# Patient Record
Sex: FEMALE | Race: WHITE | NOT HISPANIC OR LATINO | Employment: OTHER | URBAN - METROPOLITAN AREA
[De-identification: names, ages, dates, MRNs, and addresses within clinical notes are randomized per-mention and may not be internally consistent; named-entity substitution may affect disease eponyms.]

---

## 2020-10-14 ENCOUNTER — APPOINTMENT (OUTPATIENT)
Dept: GENERAL RADIOLOGY | Facility: HOSPITAL | Age: 85
End: 2020-10-14

## 2020-10-14 ENCOUNTER — HOSPITAL ENCOUNTER (EMERGENCY)
Facility: HOSPITAL | Age: 85
Discharge: HOME | End: 2020-10-15
Attending: EMERGENCY MEDICINE | Admitting: EMERGENCY MEDICINE

## 2020-10-14 DIAGNOSIS — S09.90XA CLOSED HEAD INJURY, INITIAL ENCOUNTER: ICD-10-CM

## 2020-10-14 DIAGNOSIS — S42.101A CLOSED FRACTURE OF RIGHT SCAPULA, UNSPECIFIED PART OF SCAPULA, INITIAL ENCOUNTER: ICD-10-CM
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
## 2020-10-14 LAB
BASOPHILS # BLD AUTO: 0.04 10*3/MM3 (ref 0–0.2)
BASOPHILS NFR BLD AUTO: 0.5 % (ref 0–1.5)
CK SERPL-CCNC: 159 U/L (ref 20–180)
DEPRECATED RDW RBC AUTO: 40.6 FL (ref 37–54)
EOSINOPHIL # BLD AUTO: 0.28 10*3/MM3 (ref 0–0.4)
EOSINOPHIL NFR BLD AUTO: 3.6 % (ref 0.3–6.2)
ERYTHROCYTE [DISTWIDTH] IN BLOOD BY AUTOMATED COUNT: 13.3 % (ref 12.3–15.4)
HCT VFR BLD AUTO: 39.4 % (ref 34–46.6)
HGB BLD-MCNC: 13.3 G/DL (ref 12–15.9)
IMM GRANULOCYTES # BLD AUTO: 0.04 10*3/MM3 (ref 0–0.05)
IMM GRANULOCYTES NFR BLD AUTO: 0.5 % (ref 0–0.5)
LYMPHOCYTES # BLD AUTO: 1.88 10*3/MM3 (ref 0.7–3.1)
LYMPHOCYTES NFR BLD AUTO: 23.9 % (ref 19.6–45.3)
MCH RBC QN AUTO: 28.7 PG (ref 26.6–33)
MCHC RBC AUTO-ENTMCNC: 33.8 G/DL (ref 31.5–35.7)
MCV RBC AUTO: 85.1 FL (ref 79–97)
MONOCYTES # BLD AUTO: 0.83 10*3/MM3 (ref 0.1–0.9)
MONOCYTES NFR BLD AUTO: 10.5 % (ref 5–12)
NEUTROPHILS NFR BLD AUTO: 4.81 10*3/MM3 (ref 1.7–7)
NEUTROPHILS NFR BLD AUTO: 61 % (ref 42.7–76)
NRBC BLD AUTO-RTO: 0 /100 WBC (ref 0–0.2)
NT-PROBNP SERPL-MCNC: 624.1 PG/ML (ref 0–1800)
PLATELET # BLD AUTO: 174 10*3/MM3 (ref 140–450)
PMV BLD AUTO: 10.3 FL (ref 6–12)
RBC # BLD AUTO: 4.63 10*6/MM3 (ref 3.77–5.28)
WBC # BLD AUTO: 7.88 10*3/MM3 (ref 3.4–10.8)

## 2020-10-14 PROCEDURE — 82550 ASSAY OF CK (CPK): CPT | Performed by: PHYSICIAN ASSISTANT

## 2020-10-14 PROCEDURE — 85025 COMPLETE CBC W/AUTO DIFF WBC: CPT | Performed by: PHYSICIAN ASSISTANT

## 2020-10-14 PROCEDURE — 80053 COMPREHEN METABOLIC PANEL: CPT | Performed by: EMERGENCY MEDICINE

## 2020-10-14 PROCEDURE — 83880 ASSAY OF NATRIURETIC PEPTIDE: CPT | Performed by: PHYSICIAN ASSISTANT

## 2020-10-14 PROCEDURE — 36415 COLL VENOUS BLD VENIPUNCTURE: CPT

## 2020-10-14 PROCEDURE — 99283 EMERGENCY DEPT VISIT LOW MDM: CPT

## 2020-10-14 PROCEDURE — 73030 X-RAY EXAM OF SHOULDER: CPT

## 2020-10-15 ENCOUNTER — APPOINTMENT (OUTPATIENT)
Dept: CT IMAGING | Facility: HOSPITAL | Age: 85
End: 2020-10-15

## 2020-10-15 VITALS
RESPIRATION RATE: 18 BRPM | HEART RATE: 63 BPM | SYSTOLIC BLOOD PRESSURE: 183 MMHG | OXYGEN SATURATION: 95 % | HEIGHT: 62 IN | BODY MASS INDEX: 25.03 KG/M2 | DIASTOLIC BLOOD PRESSURE: 92 MMHG | WEIGHT: 136 LBS | TEMPERATURE: 97.6 F

## 2020-10-15 LAB
ALBUMIN SERPL-MCNC: 4.3 G/DL (ref 3.5–5.2)
ALBUMIN/GLOB SERPL: 1.5 G/DL
ALP SERPL-CCNC: 93 U/L (ref 39–117)
ALT SERPL W P-5'-P-CCNC: 16 U/L (ref 1–33)
ANION GAP SERPL CALCULATED.3IONS-SCNC: 11.4 MMOL/L (ref 5–15)
AST SERPL-CCNC: 21 U/L (ref 1–32)
BILIRUB SERPL-MCNC: 0.5 MG/DL (ref 0–1.2)
BUN SERPL-MCNC: 19 MG/DL (ref 8–23)
BUN/CREAT SERPL: 24.1 (ref 7–25)
CALCIUM SPEC-SCNC: 9.4 MG/DL (ref 8.6–10.5)
CHLORIDE SERPL-SCNC: 105 MMOL/L (ref 98–107)
CO2 SERPL-SCNC: 26.6 MMOL/L (ref 22–29)
CREAT SERPL-MCNC: 0.79 MG/DL (ref 0.57–1)
GFR SERPL CREATININE-BSD FRML MDRD: 69 ML/MIN/1.73
GLOBULIN UR ELPH-MCNC: 2.8 GM/DL
GLUCOSE SERPL-MCNC: 100 MG/DL (ref 65–99)
HOLD SPECIMEN: NORMAL
HOLD SPECIMEN: NORMAL
POTASSIUM SERPL-SCNC: 4 MMOL/L (ref 3.5–5.2)
PROT SERPL-MCNC: 7.1 G/DL (ref 6–8.5)
SODIUM SERPL-SCNC: 143 MMOL/L (ref 136–145)
WHOLE BLOOD HOLD SPECIMEN: NORMAL
WHOLE BLOOD HOLD SPECIMEN: NORMAL

## 2020-10-15 PROCEDURE — 72125 CT NECK SPINE W/O DYE: CPT

## 2020-10-15 PROCEDURE — 70450 CT HEAD/BRAIN W/O DYE: CPT

## 2020-10-15 NOTE — ED PROVIDER NOTES
EMERGENCY DEPARTMENT ENCOUNTER    CHIEF COMPLAINT  Chief Complaint: Fall  History given by: Patient/daughter  History limited by: None  Room Number: 11/11  PMD: Provider, No Known      HPI:  Pt is a 88 y.o. female who presents complaining of a mechanical fall that occurred 3 days ago.  The patient is unsure of exactly why she fell but states that she falls frequently.  The daughter states that she has a known acoustic neuroma which they said certainly could contribute to her falls.  The patient states that she fell suddenly Saturday night but could not get up at that point and spent the entire night in the floor until she was helped up [place] morning.  Since then the patient states that she has been increasingly more sore to the head as well as to the posterior right shoulder.  Pain is described as a dull ache and more of a sore type sensation.  Symptoms are nonradiating.  Symptoms are currently mild to moderate in intensity and progressively worsening.  The patient has been ambulatory without any difficulty since this fall.  There is been no treatment rendered prior to arrival.  The patient denies any associated neck pain, chest pain, abdominal pain, nausea/vomiting, dizziness, vertigo, blurry vision, or known sick contacts.  There are no alleviating nor aggravating factors.      PAST MEDICAL HISTORY  Active Ambulatory Problems     Diagnosis Date Noted   • No Active Ambulatory Problems     Resolved Ambulatory Problems     Diagnosis Date Noted   • No Resolved Ambulatory Problems     No Additional Past Medical History       PAST SURGICAL HISTORY  History reviewed. No pertinent surgical history.    FAMILY HISTORY  No family history on file.    SOCIAL HISTORY  Social History     Socioeconomic History   • Marital status:      Spouse name: Not on file   • Number of children: Not on file   • Years of education: Not on file   • Highest education level: Not on file       ALLERGIES  Bacitracin    REVIEW OF  SYSTEMS  Review of Systems   Constitutional: Negative for fever.   HENT: Negative for sore throat.    Eyes: Negative.    Respiratory: Negative for cough and shortness of breath.    Cardiovascular: Negative for chest pain.   Gastrointestinal: Negative for abdominal pain, diarrhea and vomiting.   Genitourinary: Negative for dysuria.   Musculoskeletal: Negative for neck pain.        Right shoulder pain   Skin: Negative for rash.   Allergic/Immunologic: Negative.    Neurological: Positive for headaches. Negative for weakness and numbness.   Hematological: Negative.    Psychiatric/Behavioral: Negative.    All other systems reviewed and are negative.      PHYSICAL EXAM  ED Triage Vitals [10/14/20 2206]   Temp Heart Rate Resp BP SpO2   97.6 °F (36.4 °C) 56 18 -- 96 %      Temp src Heart Rate Source Patient Position BP Location FiO2 (%)   Tympanic -- -- -- --       Physical Exam   Constitutional: She is oriented to person, place, and time. No distress.   HENT:   Head: Normocephalic and atraumatic.   Healing scalp contusion   Eyes: Pupils are equal, round, and reactive to light. EOM are normal.   Neck: Normal range of motion. Neck supple.   Cardiovascular: Normal rate, regular rhythm and normal heart sounds.   Pulmonary/Chest: Effort normal and breath sounds normal. No respiratory distress.   Abdominal: Soft. There is no abdominal tenderness. There is no rebound and no guarding.   Musculoskeletal: Normal range of motion.         General: No edema.   Neurological: She is alert and oriented to person, place, and time. She has normal sensation and normal strength.   Skin: Skin is warm and dry. No rash noted.   Healing contusion to the posterior right shoulder/scapula   Psychiatric: Mood and affect normal.   Nursing note and vitals reviewed.      LAB RESULTS  Lab Results (last 24 hours)     Procedure Component Value Units Date/Time    CBC & Differential [[id number]]  (Normal) Collected: 10/14/20 2848    Specimen: Blood from Arm,  Right Updated: 10/14/20 2318    Narrative:      The following orders were created for panel order CBC & Differential.  Procedure                               Abnormality         Status                     ---------                               -----------         ------                     CBC Auto Differential[[id number]]        Normal              Final result                 Please view results for these tests on the individual orders.    BNP [[id number]]  (Normal) Collected: 10/14/20 2259    Specimen: Blood from Arm, Right Updated: 10/14/20 2326     proBNP 624.1 pg/mL     Narrative:      Among patients with dyspnea, NT-proBNP is highly sensitive for the detection of acute congestive heart failure. In addition NT-proBNP of <300 pg/ml effectively rules out acute congestive heart failure with 99% negative predictive value.    Results may be falsely decreased if patient taking Biotin.      CK [[id number]]  (Normal) Collected: 10/14/20 2259    Specimen: Blood from Arm, Right Updated: 10/14/20 2327     Creatine Kinase 159 U/L     CBC Auto Differential [[id number]]  (Normal) Collected: 10/14/20 2259    Specimen: Blood from Arm, Right Updated: 10/14/20 2318     WBC 7.88 10*3/mm3      RBC 4.63 10*6/mm3      Hemoglobin 13.3 g/dL      Hematocrit 39.4 %      MCV 85.1 fL      MCH 28.7 pg      MCHC 33.8 g/dL      RDW 13.3 %      RDW-SD 40.6 fl      MPV 10.3 fL      Platelets 174 10*3/mm3      Neutrophil % 61.0 %      Lymphocyte % 23.9 %      Monocyte % 10.5 %      Eosinophil % 3.6 %      Basophil % 0.5 %      Immature Grans % 0.5 %      Neutrophils, Absolute 4.81 10*3/mm3      Lymphocytes, Absolute 1.88 10*3/mm3      Monocytes, Absolute 0.83 10*3/mm3      Eosinophils, Absolute 0.28 10*3/mm3      Basophils, Absolute 0.04 10*3/mm3      Immature Grans, Absolute 0.04 10*3/mm3      nRBC 0.0 /100 WBC     Comprehensive Metabolic Panel [[id number]]  (Abnormal) Collected: 10/14/20 2300    Specimen: Blood from Arm, Right Updated: 10/15/20  0008     Glucose 100 mg/dL      BUN 19 mg/dL      Creatinine 0.79 mg/dL      Sodium 143 mmol/L      Potassium 4.0 mmol/L      Chloride 105 mmol/L      CO2 26.6 mmol/L      Calcium 9.4 mg/dL      Total Protein 7.1 g/dL      Albumin 4.30 g/dL      ALT (SGPT) 16 U/L      AST (SGOT) 21 U/L      Alkaline Phosphatase 93 U/L      Total Bilirubin 0.5 mg/dL      eGFR Non African Amer 69 mL/min/1.73      Globulin 2.8 gm/dL      A/G Ratio 1.5 g/dL      BUN/Creatinine Ratio 24.1     Anion Gap 11.4 mmol/L     Narrative:      GFR Normal >60  Chronic Kidney Disease <60  Kidney Failure <15            I ordered the above labs and reviewed the results    RADIOLOGY  CT Head Without Contrast   Final Result   Impression:   1.  Effacement of the CSF space over the superior aspects of the frontal   and parietal lobes. Findings can be seen in setting of normal pressure   hydrocephalus in the appropriate clinical context correlation patient   history is recommended. Otherwise, no findings of acute intracranial   abnormality. Moderate to extensive chronic ischemic changes, cerebral   atrophy and lacunar infarct.   2.  No cervical spine fracture or malalignment.   3.  Other findings as above.       This report was finalized on 10/15/2020 1:28 AM by Dr. Michelet Ledesma M.D.          CT Cervical Spine Without Contrast   Final Result   Impression:   1.  Effacement of the CSF space over the superior aspects of the frontal   and parietal lobes. Findings can be seen in setting of normal pressure   hydrocephalus in the appropriate clinical context correlation patient   history is recommended. Otherwise, no findings of acute intracranial   abnormality. Moderate to extensive chronic ischemic changes, cerebral   atrophy and lacunar infarct.   2.  No cervical spine fracture or malalignment.   3.  Other findings as above.       This report was finalized on 10/15/2020 1:28 AM by Dr. Michelet Ledesma M.D.          XR Shoulder 2+ View Right   Final Result    FINDINGS AND IMPRESSION:   There is a comminuted fracture extending through the acromion and   scapula with concern for extension into the adjacent glenoid.   Unfortunately findings are not well characterized on plain film shoulder   radiographs and further evaluation with CT of the right shoulder is   recommended to further characterize. The glenohumeral joint is located.       This report was finalized on 10/15/2020 12:31 AM by Dr. Michelet Ledesma M.D.               I ordered the above noted radiological studies. Interpreted by radiologist.  Reviewed by me in PACS.       PROCEDURES  Procedures      PROGRESS AND CONSULTS     The patient was wearing a facemask upon entrance into the room and remained in such throughout their visit.  I was wearing PPE including a facemask, eye protection, as well as gloves at any point entering the room and throughout the visit    0200  Upon reevaluation, the patient is resting comfortably in the room and without any further complaint.  I did inform the family of her unremarkable laboratory results as well as CT scan of the head and cervical spine.  I did inform them of the right-sided scapular fracture present on imaging.  I did suggest that we place the patient in a sling and have her follow-up with orthopedics for further management and input of the fracture.  The patient as well as daughter agrees and all questions have been answered.    MEDICAL DECISION MAKING  Results were reviewed/discussed with the patient and they were also made aware of online access. Pt also made aware that some labs, such as cultures, will not be resulted during ER visit and follow up with PMD is necessary.     MDM  Number of Diagnoses or Management Options  Closed fracture of right scapula, unspecified part of scapula, initial encounter:   Closed head injury, initial encounter:   Fall, initial encounter:      Amount and/or Complexity of Data Reviewed  Clinical lab tests: ordered and reviewed  Tests in  the radiology section of CPT®: ordered and reviewed  Review and summarize past medical records: yes (There are no previous emergency room records available for review)  Independent visualization of images, tracings, or specimens: yes (X-ray of the right shoulder showing a scapular fracture.  CT scan of the head/cervical spine negative for acute traumatic abnormality.)           DIAGNOSIS  Final diagnoses:   Fall, initial encounter   Closed head injury, initial encounter   Closed fracture of right scapula, unspecified part of scapula, initial encounter       DISPOSITION  DISCHARGE    Patient discharged in stable condition.    Reviewed implications of results, diagnosis, meds, responsibility to follow up, warning signs and symptoms of possible worsening, potential complications and reasons to return to ER    Patient/Family voiced understanding of above instructions.    Discussed plan for discharge, as there is no emergent indication for admission. Patient referred to primary care provider for BP management due to today's BP. Pt/family is agreeable and understands need for follow up and repeat testing.  Pt is aware that discharge does not mean that nothing is wrong but it indicates no emergency is present that requires admission and they must continue care with follow-up as given below or physician of their choice.     FOLLOW-UP  Inocente Boone MD  [address] [id number] [phone]    Schedule an appointment as soon as possible for a visit            Medication List      No changes were made to your prescriptions during this visit.         Latest Documented Vital Signs:  As of 06:41 EDT  BP- (!) 183/92 HR- 63 Temp- 97.6 °F (36.4 °C) (Tympanic) O2 sat- 95%       Sawyer Hawk MD  10/15/20 0600

## 2020-10-15 NOTE — ED TRIAGE NOTES
"Pt arrives via pv with daughter from [facility]. Pt fell on Sunday and said \"I was laying there all night because I did not want to wake my kids up.\" pt lives at home by herself. Denies LOC. Pt hit head on floor, bruising noted to back of pt head. Pt also bruising to right shoulder and right elbow. No thinners. Pt is alert and oriented x4, answering questions appropriately and appears to be in NAD at this time.     Pt given mask in triage, staff in PPE.   "

## 2020-10-15 NOTE — ED NOTES
Patient provided discharge instructions at this time.  Respirations are even and unlabored, chest rise and fall is equal in expansion.  All patients questions answered prior to departure from the ED.  Pt capillary refill within normal limit, speech normal.      Pt masked upon arrival. This nurse wearing mask and goggles during entire encounter.     Mariia Quezada RN  10/15/20 7267

## 2021-01-10 ENCOUNTER — APPOINTMENT (OUTPATIENT)
Dept: CT IMAGING | Facility: HOSPITAL | Age: 86
End: 2021-01-10

## 2021-01-10 ENCOUNTER — HOSPITAL ENCOUNTER (EMERGENCY)
Facility: HOSPITAL | Age: 86
Discharge: HOME | End: 2021-01-10
Attending: EMERGENCY MEDICINE | Admitting: EMERGENCY MEDICINE

## 2021-01-10 VITALS
RESPIRATION RATE: 18 BRPM | WEIGHT: 136 LBS | DIASTOLIC BLOOD PRESSURE: 78 MMHG | HEART RATE: 49 BPM | SYSTOLIC BLOOD PRESSURE: 131 MMHG | BODY MASS INDEX: 25.03 KG/M2 | HEIGHT: 62 IN | OXYGEN SATURATION: 94 % | TEMPERATURE: 96.3 F

## 2021-01-10 DIAGNOSIS — S32.010A COMPRESSION FRACTURE OF L1 VERTEBRA, INITIAL ENCOUNTER (HCC): ICD-10-CM

## 2021-01-10 DIAGNOSIS — S22.31XA CLOSED FRACTURE OF ONE RIB OF RIGHT SIDE, INITIAL ENCOUNTER: Primary | ICD-10-CM

## 2021-01-10 DIAGNOSIS — S22.080A COMPRESSION FRACTURE OF T12 VERTEBRA, INITIAL ENCOUNTER (HCC): ICD-10-CM

## 2021-01-10 LAB
BACTERIA UR QL AUTO: ABNORMAL /HPF
BILIRUB UR QL STRIP: NEGATIVE
CLARITY UR: CLEAR
COLOR UR: YELLOW
GLUCOSE UR STRIP-MCNC: NEGATIVE MG/DL
HGB UR QL STRIP.AUTO: ABNORMAL
HYALINE CASTS UR QL AUTO: ABNORMAL /LPF
KETONES UR QL STRIP: NEGATIVE
LEUKOCYTE ESTERASE UR QL STRIP.AUTO: ABNORMAL
NITRITE UR QL STRIP: NEGATIVE
PH UR STRIP.AUTO: 7 [PH] (ref 5–8)
PROT UR QL STRIP: NEGATIVE
RBC # UR: ABNORMAL /HPF
REF LAB TEST METHOD: ABNORMAL
SP GR UR STRIP: 1.01 (ref 1–1.03)
SQUAMOUS #/AREA URNS HPF: ABNORMAL /HPF
UROBILINOGEN UR QL STRIP: ABNORMAL
WBC UR QL AUTO: ABNORMAL /HPF

## 2021-01-10 PROCEDURE — 99283 EMERGENCY DEPT VISIT LOW MDM: CPT

## 2021-01-10 PROCEDURE — P9612 CATHETERIZE FOR URINE SPEC: HCPCS

## 2021-01-10 PROCEDURE — 71250 CT THORAX DX C-: CPT

## 2021-01-10 PROCEDURE — 81001 URINALYSIS AUTO W/SCOPE: CPT | Performed by: EMERGENCY MEDICINE

## 2021-01-10 PROCEDURE — 70450 CT HEAD/BRAIN W/O DYE: CPT

## 2021-01-10 RX ORDER — ATORVASTATIN CALCIUM 40 MG/1
40 TABLET, FILM COATED ORAL DAILY
COMMUNITY
Start: 2020-11-29

## 2021-01-10 RX ORDER — SERTRALINE HYDROCHLORIDE 100 MG/1
TABLET, FILM COATED ORAL
COMMUNITY
Start: 2020-08-20

## 2021-01-10 RX ORDER — PROPRANOLOL HYDROCHLORIDE 80 MG/1
CAPSULE, EXTENDED RELEASE ORAL
COMMUNITY
Start: 2020-08-25 | End: 2022-01-01 | Stop reason: HOSPADM

## 2021-01-10 RX ORDER — FELODIPINE 10 MG/1
TABLET, EXTENDED RELEASE ORAL
COMMUNITY
Start: 2020-10-12

## 2021-01-10 RX ORDER — LEVOTHYROXINE SODIUM 0.1 MG/1
TABLET ORAL
COMMUNITY
Start: 2020-10-02 | End: 2022-01-01 | Stop reason: HOSPADM

## 2021-01-10 NOTE — ED PROVIDER NOTES
EMERGENCY DEPARTMENT ENCOUNTER    Room Number:  16/16  Date seen:  1/10/2021  PCP: Provider, No Known  Historian: Patient      HPI:  Chief Complaint: Rib pain  A complete HPI/ROS/PMH/PSH/SH/FH are unobtainable due to: Nothing  Context: Katie Douglas is a 89 y.o. female who presents to the ED c/o right sided rib pain after a fall from standing 2 days ago.  Patient reports that she lost her balance and went down hitting her chest and her head.  She reports no loss of consciousness.  She has had a mild intermittent headache since then.  She denies taking blood thinner medications.  She reports that she has pain mostly on the right side of her ribs and her right side of her back.  She has had a prior scapular fracture in the past.  She has been taking Tylenol for the pain with some relief.  She has not had any pain medication today.  She denies any significant pain in her extremities.  She denies neck pain.    Patient also reports some urinary frequency for the last few days.        PAST MEDICAL HISTORY  Active Ambulatory Problems     Diagnosis Date Noted   • No Active Ambulatory Problems     Resolved Ambulatory Problems     Diagnosis Date Noted   • No Resolved Ambulatory Problems     Past Medical History:   Diagnosis Date   • Arthritis    • Disease of thyroid gland    • Hyperlipidemia    • Hypertension          PAST SURGICAL HISTORY  History reviewed. No pertinent surgical history.      FAMILY HISTORY  History reviewed. No pertinent family history.      SOCIAL HISTORY  Social History     Socioeconomic History   • Marital status:      Spouse name: Not on file   • Number of children: Not on file   • Years of education: Not on file   • Highest education level: Not on file         ALLERGIES  Bacitracin        REVIEW OF SYSTEMS  Review of Systems   Review of all 14 systems is negative other than stated in the HPI above.      PHYSICAL EXAM  ED Triage Vitals   Temp Heart Rate Resp BP SpO2   01/10/21 1210 01/10/21  1210 01/10/21 1210 01/10/21 1218 01/10/21 1210   96.3 °F (35.7 °C) 63 18 175/83 97 %      Temp src Heart Rate Source Patient Position BP Location FiO2 (%)   01/10/21 1210 01/10/21 1210 01/10/21 1230 -- --   Tympanic Monitor Lying           GENERAL: Awake and alert, no acute distress  HENT: nares patent, no scalp hematoma  EYES: no scleral icterus  CV: regular rhythm, normal rate  RESPIRATORY: normal effort, lungs clear to auscultation bilaterally, right lateral chest wall tenderness without crepitance  ABDOMEN: soft, nondistended, nontender throughout  MUSCULOSKELETAL: no deformity.  There is no midline T or L-spine tenderness.  There is some mild point tenderness over the right scapular region.  No midline cervical spine tenderness.  NEURO: alert, moves all extremities, follows commands  PSYCH:  calm, cooperative  SKIN: warm, dry    Vital signs and nursing notes reviewed.          LAB RESULTS  Recent Results (from the past 24 hour(s))   Urinalysis With Microscopic If Indicated (No Culture) - Urine, Catheter    Collection Time: 01/10/21  1:29 PM    Specimen: Urine, Catheter   Result Value Ref Range    Color, UA Yellow Yellow, Straw    Appearance, UA Clear Clear    pH, UA 7.0 5.0 - 8.0    Specific Gravity, UA 1.012 1.005 - 1.030    Glucose, UA Negative Negative    Ketones, UA Negative Negative    Bilirubin, UA Negative Negative    Blood, UA Trace (A) Negative    Protein, UA Negative Negative    Leuk Esterase, UA Trace (A) Negative    Nitrite, UA Negative Negative    Urobilinogen, UA 0.2 E.U./dL 0.2 - 1.0 E.U./dL   Urinalysis, Microscopic Only - Urine, Catheter    Collection Time: 01/10/21  1:29 PM    Specimen: Urine, Catheter   Result Value Ref Range    RBC, UA 0-2 None Seen, 0-2 /HPF    WBC, UA 3-5 (A) None Seen, 0-2 /HPF    Bacteria, UA None Seen None Seen /HPF    Squamous Epithelial Cells, UA 0-2 None Seen, 0-2 /HPF    Hyaline Casts, UA 0-2 None Seen /LPF    Methodology Automated Microscopy        Ordered the  above labs and reviewed the results.        RADIOLOGY  Ct Head Without Contrast    Result Date: 1/10/2021  CT SCAN OF THE BRAIN WITHOUT CONTRAST  HISTORY: Fell 2 days ago with head trauma. Headache.  FINDINGS:  The CT scan was performed as an emergency procedure through the brain without contrast. There is moderate diffuse atrophy and chronic small vessel ischemic change similar to the study of 10/15/2020. There is no evidence of acute intracranial hemorrhage or mass effect and the visualized sinuses are clear.  CT SCAN OF THE CHEST WITHOUT CONTRAST  HISTORY: Fell 2 days ago. Right rib pain.  FINDINGS:  The CT scan was performed as an emergency procedure through the chest without contrast and demonstrates the followin. The lungs are well-expanded and clear except for some very minimal dependent atelectasis at the lung bases. There is also a faintly nodular density at the left lung base posteriorly measuring 5 to 6 mm as seen on image 33. A follow-up CT scan in 6 months might be considered for this minimal finding. 2. There are several calcified right hilar and infracarinal lymph nodes. There is no mediastinal or hilar or axillary adenopathy. There is no pericardial effusion. 3. The CT images through the upper liver, spleen, both adrenal glands, and upper poles of both kidneys are unremarkable. 4. At bone windows, there is a nondisplaced fracture of the right seventh rib laterally. There is also 50% compression of the T12 vertebral body of uncertain age. There is minimal retropulsion of the upper posterior margin. There is minimal compression of the superior endplate of L1.  Radiation dose reduction techniques were utilized, including automated exposure control and exposure modulation based on body size.      Ct Chest Without Contrast Diagnostic    Result Date: 1/10/2021  CT SCAN OF THE BRAIN WITHOUT CONTRAST  HISTORY: Fell 2 days ago with head trauma. Headache.  FINDINGS:  The CT scan was performed as an  emergency procedure through the brain without contrast. There is moderate diffuse atrophy and chronic small vessel ischemic change similar to the study of 10/15/2020. There is no evidence of acute intracranial hemorrhage or mass effect and the visualized sinuses are clear.  CT SCAN OF THE CHEST WITHOUT CONTRAST  HISTORY: Fell 2 days ago. Right rib pain.  FINDINGS:  The CT scan was performed as an emergency procedure through the chest without contrast and demonstrates the followin. The lungs are well-expanded and clear except for some very minimal dependent atelectasis at the lung bases. There is also a faintly nodular density at the left lung base posteriorly measuring 5 to 6 mm as seen on image 33. A follow-up CT scan in 6 months might be considered for this minimal finding. 2. There are several calcified right hilar and infracarinal lymph nodes. There is no mediastinal or hilar or axillary adenopathy. There is no pericardial effusion. 3. The CT images through the upper liver, spleen, both adrenal glands, and upper poles of both kidneys are unremarkable. 4. At bone windows, there is a nondisplaced fracture of the right seventh rib laterally. There is also 50% compression of the T12 vertebral body of uncertain age. There is minimal retropulsion of the upper posterior margin. There is minimal compression of the superior endplate of L1.  Radiation dose reduction techniques were utilized, including automated exposure control and exposure modulation based on body size.        Ordered the above noted radiological studies. Reviewed by me in PACS.            PROCEDURES  Procedures          MEDICATIONS GIVEN IN ER  Medications - No data to display                MEDICAL DECISION MAKING, PROGRESS, and CONSULTS    All labs have been independently reviewed by me.  All radiology studies have been reviewed by me and discussed with radiologist dictating the report.   EKG's independently viewed and interpreted by me.   Discussion below represents my analysis of pertinent findings related to patient's condition, differential diagnosis, treatment plan and final disposition.    ED Course as of Bo 10 1555   Sun Bo 10, 2021   1500 Patient updated on findings of seventh rib fracture and also T12 and L1 compression deformities.  She denies any back pain and had no midline L-spine tenderness.  I explained that she should follow-up with a spine doctor as outpatient regarding these fractures.  Given she has no pain at all think that bracing would be beneficial for her.  She is neuro intact.  Urinalysis was without evidence of urinary tract infection.  She has been voiding spontaneously here.  She denies urinary incontinence.  Patient was offered additional pain medication however she prefers to take Tylenol and use lidocaine patches which she has at home.  Return precautions were discussed.    [JR]      ED Course User Index  [JR] Eldon Sutherland MD              I wore a surgical mask, gloves, and eye protection during this patient encounter.  Patient also wearing a surgical mask.  Hand hygeine performed before and after seeing the patient.    DIAGNOSIS  Final diagnoses:   Closed fracture of one rib of right side, initial encounter   Compression fracture of L1 vertebra, initial encounter (CMS/[facility])   Compression fracture of T12 vertebra, initial encounter (CMS/[facility])         DISPOSITION  DISCHARGE    Patient discharged in stable condition.    Reviewed implications of results, diagnosis, meds, responsibility to follow up, warning signs and symptoms of possible worsening, potential complications and reasons to return to ER.    Patient/Family voiced understanding of above instructions.    Discussed plan for discharge, as there is no emergent indication for admission. Patient referred to primary care provider for BP management due to today's BP. Pt/family is agreeable and understands need for follow up and repeat testing.  Pt is aware that  discharge does not mean that nothing is wrong but it indicates no emergency is present that requires admission and they must continue care with follow-up as given below or physician of their choice.     FOLLOW-UP  Cory Allen MD  [address] [place] [id number] [phone]    Schedule an appointment as soon as possible for a visit            Medication List      No changes were made to your prescriptions during this visit.                   Latest Documented Vital Signs:  As of 15:55 EST  BP- 131/78 HR- (!) 49 Temp- 96.3 °F (35.7 °C) (Tympanic) O2 sat- 94%        --    Please note that portions of this were completed with a voice recognition program.          Eldon Sutherland MD  01/10/21 3503

## 2021-01-10 NOTE — ED TRIAGE NOTES
"Pt to ER via PV. Pt states fall this past Wednesday by losing her balance. Pt states \"I hurt all over\" but more specifically c/o right sided rib pain.     Pt also c/o urinary frequency     Patient in mask. This RN in appropriate PPE - including mask, goggles, and gloves during all of patient care.     "

## 2022-01-01 ENCOUNTER — APPOINTMENT (OUTPATIENT)
Dept: CT IMAGING | Facility: HOSPITAL | Age: 87
End: 2022-01-01

## 2022-01-01 ENCOUNTER — HOSPITAL ENCOUNTER (INPATIENT)
Facility: HOSPITAL | Age: 87
LOS: 10 days | Discharge: NURSING, REHAB OR LONG-TERM CARE | End: 2022-04-06
Attending: EMERGENCY MEDICINE | Admitting: HOSPITALIST

## 2022-01-01 ENCOUNTER — HOSPITAL ENCOUNTER (INPATIENT)
Facility: HOSPITAL | Age: 87
LOS: 7 days | End: 2022-05-28
Attending: INTERNAL MEDICINE | Admitting: INTERNAL MEDICINE

## 2022-01-01 ENCOUNTER — PREP FOR SURGERY (OUTPATIENT)
Dept: OTHER | Facility: HOSPITAL | Age: 87
End: 2022-01-01

## 2022-01-01 ENCOUNTER — HOSPITAL ENCOUNTER (EMERGENCY)
Facility: HOSPITAL | Age: 87
Discharge: NURSING, REHAB OR LONG-TERM CARE | End: 2022-04-11
Attending: EMERGENCY MEDICINE | Admitting: EMERGENCY MEDICINE

## 2022-01-01 ENCOUNTER — APPOINTMENT (OUTPATIENT)
Dept: GENERAL RADIOLOGY | Facility: HOSPITAL | Age: 87
End: 2022-01-01

## 2022-01-01 ENCOUNTER — HOSPITAL ENCOUNTER (EMERGENCY)
Facility: HOSPITAL | Age: 87
Discharge: NURSING, REHAB OR LONG-TERM CARE | End: 2022-05-10
Attending: EMERGENCY MEDICINE | Admitting: EMERGENCY MEDICINE

## 2022-01-01 ENCOUNTER — HOSPITAL ENCOUNTER (INPATIENT)
Facility: HOSPITAL | Age: 87
LOS: 4 days | Discharge: HOSPICE | End: 2022-05-21
Attending: EMERGENCY MEDICINE | Admitting: STUDENT IN AN ORGANIZED HEALTH CARE EDUCATION/TRAINING PROGRAM

## 2022-01-01 VITALS
HEART RATE: 73 BPM | HEIGHT: 62 IN | WEIGHT: 132.72 LBS | OXYGEN SATURATION: 97 % | BODY MASS INDEX: 24.42 KG/M2 | RESPIRATION RATE: 20 BRPM | TEMPERATURE: 98.9 F | DIASTOLIC BLOOD PRESSURE: 102 MMHG | SYSTOLIC BLOOD PRESSURE: 166 MMHG

## 2022-01-01 VITALS
TEMPERATURE: 98.3 F | DIASTOLIC BLOOD PRESSURE: 88 MMHG | HEIGHT: 62 IN | HEART RATE: 67 BPM | BODY MASS INDEX: 23.92 KG/M2 | OXYGEN SATURATION: 96 % | SYSTOLIC BLOOD PRESSURE: 189 MMHG | RESPIRATION RATE: 14 BRPM | WEIGHT: 130 LBS

## 2022-01-01 VITALS
DIASTOLIC BLOOD PRESSURE: 82 MMHG | HEART RATE: 75 BPM | SYSTOLIC BLOOD PRESSURE: 145 MMHG | HEIGHT: 62 IN | BODY MASS INDEX: 22.08 KG/M2 | WEIGHT: 120 LBS | OXYGEN SATURATION: 92 % | RESPIRATION RATE: 16 BRPM | TEMPERATURE: 97.4 F

## 2022-01-01 VITALS
HEART RATE: 60 BPM | WEIGHT: 124.2 LBS | BODY MASS INDEX: 22.86 KG/M2 | OXYGEN SATURATION: 93 % | RESPIRATION RATE: 14 BRPM | DIASTOLIC BLOOD PRESSURE: 81 MMHG | TEMPERATURE: 98.1 F | SYSTOLIC BLOOD PRESSURE: 164 MMHG | HEIGHT: 62 IN

## 2022-01-01 VITALS — TEMPERATURE: 100.4 F

## 2022-01-01 DIAGNOSIS — R26.89 BALANCE PROBLEMS: ICD-10-CM

## 2022-01-01 DIAGNOSIS — R13.10 DYSPHAGIA, UNSPECIFIED TYPE: Primary | ICD-10-CM

## 2022-01-01 DIAGNOSIS — Z78.9 DECREASED ACTIVITIES OF DAILY LIVING (ADL): ICD-10-CM

## 2022-01-01 DIAGNOSIS — M54.59 INTRACTABLE LOW BACK PAIN: Primary | ICD-10-CM

## 2022-01-01 DIAGNOSIS — S00.83XA FOREHEAD CONTUSION, INITIAL ENCOUNTER: Primary | ICD-10-CM

## 2022-01-01 DIAGNOSIS — N39.0 ACUTE UTI: Primary | ICD-10-CM

## 2022-01-01 DIAGNOSIS — M54.50 LOW BACK PAIN, UNSPECIFIED BACK PAIN LATERALITY, UNSPECIFIED CHRONICITY, UNSPECIFIED WHETHER SCIATICA PRESENT: ICD-10-CM
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
## 2022-01-01 DIAGNOSIS — G93.41 METABOLIC ENCEPHALOPATHY: ICD-10-CM
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
## 2022-01-01 DIAGNOSIS — R53.1 GENERALIZED WEAKNESS: ICD-10-CM

## 2022-01-01 DIAGNOSIS — R63.0 DECREASED APPETITE: ICD-10-CM

## 2022-01-01 DIAGNOSIS — N39.0 ACUTE UTI: ICD-10-CM

## 2022-01-01 DIAGNOSIS — R53.1 GENERALIZED WEAKNESS: Primary | ICD-10-CM

## 2022-01-01 DIAGNOSIS — R63.0 POOR APPETITE: ICD-10-CM

## 2022-01-01 DIAGNOSIS — E87.6 HYPOKALEMIA: ICD-10-CM
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
## 2022-01-01 DIAGNOSIS — R62.7 FAILURE TO THRIVE IN ADULT: ICD-10-CM

## 2022-01-01 DIAGNOSIS — R93.3 ABNORMAL ESOPHAGRAM: ICD-10-CM

## 2022-01-01 LAB
25(OH)D3 SERPL-MCNC: 7.6 NG/ML (ref 30–100)
ALBUMIN SERPL-MCNC: 3.1 G/DL (ref 3.5–5.2)
ALBUMIN SERPL-MCNC: 3.5 G/DL (ref 3.5–5.2)
ALBUMIN SERPL-MCNC: 3.8 G/DL (ref 3.5–5.2)
ALBUMIN SERPL-MCNC: 3.9 G/DL (ref 3.5–5.2)
ALBUMIN/GLOB SERPL: 1 G/DL
ALBUMIN/GLOB SERPL: 1 G/DL
ALBUMIN/GLOB SERPL: 1.1 G/DL
ALBUMIN/GLOB SERPL: 1.2 G/DL
ALP SERPL-CCNC: 70 U/L (ref 39–117)
ALP SERPL-CCNC: 74 U/L (ref 39–117)
ALP SERPL-CCNC: 79 U/L (ref 39–117)
ALP SERPL-CCNC: 87 U/L (ref 39–117)
ALT SERPL W P-5'-P-CCNC: 10 U/L (ref 1–33)
ALT SERPL W P-5'-P-CCNC: 11 U/L (ref 1–33)
ALT SERPL W P-5'-P-CCNC: 6 U/L (ref 1–33)
ALT SERPL W P-5'-P-CCNC: 7 U/L (ref 1–33)
AMPHET+METHAMPHET UR QL: NEGATIVE
ANION GAP SERPL CALCULATED.3IONS-SCNC: 10 MMOL/L (ref 5–15)
ANION GAP SERPL CALCULATED.3IONS-SCNC: 10.4 MMOL/L (ref 5–15)
ANION GAP SERPL CALCULATED.3IONS-SCNC: 10.6 MMOL/L (ref 5–15)
ANION GAP SERPL CALCULATED.3IONS-SCNC: 11 MMOL/L (ref 5–15)
ANION GAP SERPL CALCULATED.3IONS-SCNC: 11.3 MMOL/L (ref 5–15)
ANION GAP SERPL CALCULATED.3IONS-SCNC: 12.9 MMOL/L (ref 5–15)
ANION GAP SERPL CALCULATED.3IONS-SCNC: 13.3 MMOL/L (ref 5–15)
ANION GAP SERPL CALCULATED.3IONS-SCNC: 13.9 MMOL/L (ref 5–15)
ANION GAP SERPL CALCULATED.3IONS-SCNC: 14 MMOL/L (ref 5–15)
ANION GAP SERPL CALCULATED.3IONS-SCNC: 15 MMOL/L (ref 5–15)
ANION GAP SERPL CALCULATED.3IONS-SCNC: 15 MMOL/L (ref 5–15)
ANION GAP SERPL CALCULATED.3IONS-SCNC: 17 MMOL/L (ref 5–15)
ANION GAP SERPL CALCULATED.3IONS-SCNC: 9 MMOL/L (ref 5–15)
AST SERPL-CCNC: 10 U/L (ref 1–32)
AST SERPL-CCNC: 10 U/L (ref 1–32)
AST SERPL-CCNC: 11 U/L (ref 1–32)
AST SERPL-CCNC: 14 U/L (ref 1–32)
B PARAPERT DNA SPEC QL NAA+PROBE: NOT DETECTED
B PERT DNA SPEC QL NAA+PROBE: NOT DETECTED
BACTERIA SPEC AEROBE CULT: ABNORMAL
BACTERIA SPEC AEROBE CULT: ABNORMAL
BACTERIA UR QL AUTO: ABNORMAL /HPF
BARBITURATES UR QL SCN: NEGATIVE
BASOPHILS # BLD AUTO: 0.02 10*3/MM3 (ref 0–0.2)
BASOPHILS # BLD AUTO: 0.03 10*3/MM3 (ref 0–0.2)
BASOPHILS # BLD AUTO: 0.04 10*3/MM3 (ref 0–0.2)
BASOPHILS # BLD AUTO: 0.05 10*3/MM3 (ref 0–0.2)
BASOPHILS # BLD AUTO: 0.06 10*3/MM3 (ref 0–0.2)
BASOPHILS NFR BLD AUTO: 0.3 % (ref 0–1.5)
BASOPHILS NFR BLD AUTO: 0.3 % (ref 0–1.5)
BASOPHILS NFR BLD AUTO: 0.4 % (ref 0–1.5)
BASOPHILS NFR BLD AUTO: 0.5 % (ref 0–1.5)
BASOPHILS NFR BLD AUTO: 0.5 % (ref 0–1.5)
BASOPHILS NFR BLD AUTO: 0.6 % (ref 0–1.5)
BASOPHILS NFR BLD AUTO: 0.7 % (ref 0–1.5)
BENZODIAZ UR QL SCN: NEGATIVE
BILIRUB SERPL-MCNC: 0.4 MG/DL (ref 0–1.2)
BILIRUB SERPL-MCNC: 0.6 MG/DL (ref 0–1.2)
BILIRUB SERPL-MCNC: 0.8 MG/DL (ref 0–1.2)
BILIRUB SERPL-MCNC: 0.8 MG/DL (ref 0–1.2)
BILIRUB UR QL STRIP: NEGATIVE
BUN SERPL-MCNC: 10 MG/DL (ref 8–23)
BUN SERPL-MCNC: 11 MG/DL (ref 8–23)
BUN SERPL-MCNC: 12 MG/DL (ref 8–23)
BUN SERPL-MCNC: 13 MG/DL (ref 8–23)
BUN SERPL-MCNC: 14 MG/DL (ref 8–23)
BUN SERPL-MCNC: 15 MG/DL (ref 8–23)
BUN SERPL-MCNC: 7 MG/DL (ref 8–23)
BUN SERPL-MCNC: 8 MG/DL (ref 8–23)
BUN SERPL-MCNC: 9 MG/DL (ref 8–23)
BUN/CREAT SERPL: 10.6 (ref 7–25)
BUN/CREAT SERPL: 11.3 (ref 7–25)
BUN/CREAT SERPL: 13.8 (ref 7–25)
BUN/CREAT SERPL: 14.1 (ref 7–25)
BUN/CREAT SERPL: 16.4 (ref 7–25)
BUN/CREAT SERPL: 16.4 (ref 7–25)
BUN/CREAT SERPL: 16.9 (ref 7–25)
BUN/CREAT SERPL: 17.1 (ref 7–25)
BUN/CREAT SERPL: 18.2 (ref 7–25)
BUN/CREAT SERPL: 18.5 (ref 7–25)
BUN/CREAT SERPL: 19.6 (ref 7–25)
BUN/CREAT SERPL: 19.7 (ref 7–25)
BUN/CREAT SERPL: 23.8 (ref 7–25)
C PNEUM DNA NPH QL NAA+NON-PROBE: NOT DETECTED
CALCIUM SPEC-SCNC: 8.4 MG/DL (ref 8.2–9.6)
CALCIUM SPEC-SCNC: 8.6 MG/DL (ref 8.2–9.6)
CALCIUM SPEC-SCNC: 8.7 MG/DL (ref 8.2–9.6)
CALCIUM SPEC-SCNC: 8.7 MG/DL (ref 8.2–9.6)
CALCIUM SPEC-SCNC: 8.9 MG/DL (ref 8.2–9.6)
CALCIUM SPEC-SCNC: 9.1 MG/DL (ref 8.2–9.6)
CALCIUM SPEC-SCNC: 9.1 MG/DL (ref 8.2–9.6)
CALCIUM SPEC-SCNC: 9.2 MG/DL (ref 8.2–9.6)
CALCIUM SPEC-SCNC: 9.2 MG/DL (ref 8.2–9.6)
CALCIUM SPEC-SCNC: 9.3 MG/DL (ref 8.2–9.6)
CALCIUM SPEC-SCNC: 9.3 MG/DL (ref 8.2–9.6)
CALCIUM SPEC-SCNC: 9.5 MG/DL (ref 8.2–9.6)
CALCIUM SPEC-SCNC: 9.5 MG/DL (ref 8.2–9.6)
CANNABINOIDS SERPL QL: NEGATIVE
CHLORIDE SERPL-SCNC: 101 MMOL/L (ref 98–107)
CHLORIDE SERPL-SCNC: 103 MMOL/L (ref 98–107)
CHLORIDE SERPL-SCNC: 104 MMOL/L (ref 98–107)
CHLORIDE SERPL-SCNC: 105 MMOL/L (ref 98–107)
CHLORIDE SERPL-SCNC: 107 MMOL/L (ref 98–107)
CHLORIDE SERPL-SCNC: 108 MMOL/L (ref 98–107)
CHLORIDE SERPL-SCNC: 108 MMOL/L (ref 98–107)
CHLORIDE SERPL-SCNC: 98 MMOL/L (ref 98–107)
CHLORIDE SERPL-SCNC: 99 MMOL/L (ref 98–107)
CK SERPL-CCNC: 187 U/L (ref 20–180)
CLARITY UR: ABNORMAL
CO2 SERPL-SCNC: 18 MMOL/L (ref 22–29)
CO2 SERPL-SCNC: 21 MMOL/L (ref 22–29)
CO2 SERPL-SCNC: 23 MMOL/L (ref 22–29)
CO2 SERPL-SCNC: 23.7 MMOL/L (ref 22–29)
CO2 SERPL-SCNC: 25.1 MMOL/L (ref 22–29)
CO2 SERPL-SCNC: 25.1 MMOL/L (ref 22–29)
CO2 SERPL-SCNC: 25.6 MMOL/L (ref 22–29)
CO2 SERPL-SCNC: 25.7 MMOL/L (ref 22–29)
CO2 SERPL-SCNC: 26 MMOL/L (ref 22–29)
CO2 SERPL-SCNC: 26.4 MMOL/L (ref 22–29)
CO2 SERPL-SCNC: 27 MMOL/L (ref 22–29)
COCAINE UR QL: NEGATIVE
COLOR UR: ABNORMAL
COLOR UR: YELLOW
COLOR UR: YELLOW
CREAT SERPL-MCNC: 0.55 MG/DL (ref 0.57–1)
CREAT SERPL-MCNC: 0.55 MG/DL (ref 0.57–1)
CREAT SERPL-MCNC: 0.56 MG/DL (ref 0.57–1)
CREAT SERPL-MCNC: 0.63 MG/DL (ref 0.57–1)
CREAT SERPL-MCNC: 0.64 MG/DL (ref 0.57–1)
CREAT SERPL-MCNC: 0.65 MG/DL (ref 0.57–1)
CREAT SERPL-MCNC: 0.66 MG/DL (ref 0.57–1)
CREAT SERPL-MCNC: 0.66 MG/DL (ref 0.57–1)
CREAT SERPL-MCNC: 0.67 MG/DL (ref 0.57–1)
CREAT SERPL-MCNC: 0.71 MG/DL (ref 0.57–1)
CREAT SERPL-MCNC: 0.82 MG/DL (ref 0.57–1)
D-LACTATE SERPL-SCNC: 0.9 MMOL/L (ref 0.5–2)
DEPRECATED RDW RBC AUTO: 38.3 FL (ref 37–54)
DEPRECATED RDW RBC AUTO: 38.5 FL (ref 37–54)
DEPRECATED RDW RBC AUTO: 39 FL (ref 37–54)
DEPRECATED RDW RBC AUTO: 39.2 FL (ref 37–54)
DEPRECATED RDW RBC AUTO: 39.6 FL (ref 37–54)
DEPRECATED RDW RBC AUTO: 40.1 FL (ref 37–54)
DEPRECATED RDW RBC AUTO: 40.3 FL (ref 37–54)
DEPRECATED RDW RBC AUTO: 40.4 FL (ref 37–54)
DEPRECATED RDW RBC AUTO: 40.8 FL (ref 37–54)
DEPRECATED RDW RBC AUTO: 41 FL (ref 37–54)
DEPRECATED RDW RBC AUTO: 41 FL (ref 37–54)
DEPRECATED RDW RBC AUTO: 42.4 FL (ref 37–54)
DEPRECATED RDW RBC AUTO: 42.6 FL (ref 37–54)
EGFRCR SERPLBLD CKD-EPI 2021: 68 ML/MIN/1.73
EGFRCR SERPLBLD CKD-EPI 2021: 80.9 ML/MIN/1.73
EGFRCR SERPLBLD CKD-EPI 2021: 83.1 ML/MIN/1.73
EGFRCR SERPLBLD CKD-EPI 2021: 83.5 ML/MIN/1.73
EGFRCR SERPLBLD CKD-EPI 2021: 83.5 ML/MIN/1.73
EGFRCR SERPLBLD CKD-EPI 2021: 83.8 ML/MIN/1.73
EGFRCR SERPLBLD CKD-EPI 2021: 84.1 ML/MIN/1.73
EGFRCR SERPLBLD CKD-EPI 2021: 84.4 ML/MIN/1.73
EGFRCR SERPLBLD CKD-EPI 2021: 86.8 ML/MIN/1.73
EGFRCR SERPLBLD CKD-EPI 2021: 87.2 ML/MIN/1.73
EGFRCR SERPLBLD CKD-EPI 2021: 87.2 ML/MIN/1.73
EOSINOPHIL # BLD AUTO: 0.02 10*3/MM3 (ref 0–0.4)
EOSINOPHIL # BLD AUTO: 0.15 10*3/MM3 (ref 0–0.4)
EOSINOPHIL # BLD AUTO: 0.16 10*3/MM3 (ref 0–0.4)
EOSINOPHIL # BLD AUTO: 0.2 10*3/MM3 (ref 0–0.4)
EOSINOPHIL # BLD AUTO: 0.23 10*3/MM3 (ref 0–0.4)
EOSINOPHIL # BLD AUTO: 0.24 10*3/MM3 (ref 0–0.4)
EOSINOPHIL # BLD AUTO: 0.3 10*3/MM3 (ref 0–0.4)
EOSINOPHIL NFR BLD AUTO: 0.3 % (ref 0.3–6.2)
EOSINOPHIL NFR BLD AUTO: 1.4 % (ref 0.3–6.2)
EOSINOPHIL NFR BLD AUTO: 2.1 % (ref 0.3–6.2)
EOSINOPHIL NFR BLD AUTO: 2.2 % (ref 0.3–6.2)
EOSINOPHIL NFR BLD AUTO: 2.5 % (ref 0.3–6.2)
EOSINOPHIL NFR BLD AUTO: 2.8 % (ref 0.3–6.2)
EOSINOPHIL NFR BLD AUTO: 3 % (ref 0.3–6.2)
EOSINOPHIL NFR BLD AUTO: 3 % (ref 0.3–6.2)
EOSINOPHIL NFR BLD AUTO: 4 % (ref 0.3–6.2)
ERYTHROCYTE [DISTWIDTH] IN BLOOD BY AUTOMATED COUNT: 12.4 % (ref 12.3–15.4)
ERYTHROCYTE [DISTWIDTH] IN BLOOD BY AUTOMATED COUNT: 12.7 % (ref 12.3–15.4)
ERYTHROCYTE [DISTWIDTH] IN BLOOD BY AUTOMATED COUNT: 12.8 % (ref 12.3–15.4)
ERYTHROCYTE [DISTWIDTH] IN BLOOD BY AUTOMATED COUNT: 13 % (ref 12.3–15.4)
ERYTHROCYTE [DISTWIDTH] IN BLOOD BY AUTOMATED COUNT: 13.1 % (ref 12.3–15.4)
ERYTHROCYTE [DISTWIDTH] IN BLOOD BY AUTOMATED COUNT: 13.1 % (ref 12.3–15.4)
ERYTHROCYTE [DISTWIDTH] IN BLOOD BY AUTOMATED COUNT: 13.2 % (ref 12.3–15.4)
ETHANOL BLD-MCNC: <10 MG/DL (ref 0–10)
ETHANOL UR QL: <0.01 %
FERRITIN SERPL-MCNC: 496 NG/ML (ref 13–150)
FLUAV SUBTYP SPEC NAA+PROBE: NOT DETECTED
FLUBV RNA ISLT QL NAA+PROBE: NOT DETECTED
FOLATE SERPL-MCNC: 8.38 NG/ML (ref 4.78–24.2)
GLOBULIN UR ELPH-MCNC: 3.1 GM/DL
GLOBULIN UR ELPH-MCNC: 3.2 GM/DL
GLOBULIN UR ELPH-MCNC: 3.3 GM/DL
GLOBULIN UR ELPH-MCNC: 3.7 GM/DL
GLUCOSE BLDC GLUCOMTR-MCNC: 106 MG/DL (ref 70–130)
GLUCOSE BLDC GLUCOMTR-MCNC: 69 MG/DL (ref 70–130)
GLUCOSE BLDC GLUCOMTR-MCNC: 80 MG/DL (ref 70–130)
GLUCOSE BLDC GLUCOMTR-MCNC: 86 MG/DL (ref 70–130)
GLUCOSE BLDC GLUCOMTR-MCNC: 90 MG/DL (ref 70–130)
GLUCOSE SERPL-MCNC: 114 MG/DL (ref 65–99)
GLUCOSE SERPL-MCNC: 118 MG/DL (ref 65–99)
GLUCOSE SERPL-MCNC: 152 MG/DL (ref 65–99)
GLUCOSE SERPL-MCNC: 62 MG/DL (ref 65–99)
GLUCOSE SERPL-MCNC: 68 MG/DL (ref 65–99)
GLUCOSE SERPL-MCNC: 79 MG/DL (ref 65–99)
GLUCOSE SERPL-MCNC: 81 MG/DL (ref 65–99)
GLUCOSE SERPL-MCNC: 81 MG/DL (ref 65–99)
GLUCOSE SERPL-MCNC: 93 MG/DL (ref 65–99)
GLUCOSE SERPL-MCNC: 95 MG/DL (ref 65–99)
GLUCOSE SERPL-MCNC: 97 MG/DL (ref 65–99)
GLUCOSE SERPL-MCNC: 97 MG/DL (ref 65–99)
GLUCOSE SERPL-MCNC: 98 MG/DL (ref 65–99)
GLUCOSE UR STRIP-MCNC: NEGATIVE MG/DL
HADV DNA SPEC NAA+PROBE: NOT DETECTED
HBA1C MFR BLD: 5.6 % (ref 4.8–5.6)
HCOV 229E RNA SPEC QL NAA+PROBE: NOT DETECTED
HCOV HKU1 RNA SPEC QL NAA+PROBE: NOT DETECTED
HCOV NL63 RNA SPEC QL NAA+PROBE: NOT DETECTED
HCOV OC43 RNA SPEC QL NAA+PROBE: NOT DETECTED
HCT VFR BLD AUTO: 32.3 % (ref 34–46.6)
HCT VFR BLD AUTO: 33.4 % (ref 34–46.6)
HCT VFR BLD AUTO: 35 % (ref 34–46.6)
HCT VFR BLD AUTO: 35.4 % (ref 34–46.6)
HCT VFR BLD AUTO: 37.3 % (ref 34–46.6)
HCT VFR BLD AUTO: 38 % (ref 34–46.6)
HCT VFR BLD AUTO: 38.6 % (ref 34–46.6)
HCT VFR BLD AUTO: 39.8 % (ref 34–46.6)
HCT VFR BLD AUTO: 40.3 % (ref 34–46.6)
HCT VFR BLD AUTO: 40.3 % (ref 34–46.6)
HCT VFR BLD AUTO: 40.9 % (ref 34–46.6)
HCT VFR BLD AUTO: 43.5 % (ref 34–46.6)
HCT VFR BLD AUTO: 44.8 % (ref 34–46.6)
HGB BLD-MCNC: 10.7 G/DL (ref 12–15.9)
HGB BLD-MCNC: 10.9 G/DL (ref 12–15.9)
HGB BLD-MCNC: 11.7 G/DL (ref 12–15.9)
HGB BLD-MCNC: 11.7 G/DL (ref 12–15.9)
HGB BLD-MCNC: 12.4 G/DL (ref 12–15.9)
HGB BLD-MCNC: 12.5 G/DL (ref 12–15.9)
HGB BLD-MCNC: 12.9 G/DL (ref 12–15.9)
HGB BLD-MCNC: 13 G/DL (ref 12–15.9)
HGB BLD-MCNC: 13.4 G/DL (ref 12–15.9)
HGB BLD-MCNC: 13.5 G/DL (ref 12–15.9)
HGB BLD-MCNC: 13.7 G/DL (ref 12–15.9)
HGB BLD-MCNC: 14.4 G/DL (ref 12–15.9)
HGB BLD-MCNC: 14.8 G/DL (ref 12–15.9)
HGB UR QL STRIP.AUTO: ABNORMAL
HMPV RNA NPH QL NAA+NON-PROBE: NOT DETECTED
HPIV1 RNA ISLT QL NAA+PROBE: NOT DETECTED
HPIV2 RNA SPEC QL NAA+PROBE: NOT DETECTED
HPIV3 RNA NPH QL NAA+PROBE: NOT DETECTED
HPIV4 P GENE NPH QL NAA+PROBE: NOT DETECTED
HYALINE CASTS UR QL AUTO: ABNORMAL /LPF
IMM GRANULOCYTES # BLD AUTO: 0.02 10*3/MM3 (ref 0–0.05)
IMM GRANULOCYTES # BLD AUTO: 0.02 10*3/MM3 (ref 0–0.05)
IMM GRANULOCYTES # BLD AUTO: 0.03 10*3/MM3 (ref 0–0.05)
IMM GRANULOCYTES # BLD AUTO: 0.04 10*3/MM3 (ref 0–0.05)
IMM GRANULOCYTES # BLD AUTO: 0.05 10*3/MM3 (ref 0–0.05)
IMM GRANULOCYTES # BLD AUTO: 0.06 10*3/MM3 (ref 0–0.05)
IMM GRANULOCYTES NFR BLD AUTO: 0.3 % (ref 0–0.5)
IMM GRANULOCYTES NFR BLD AUTO: 0.4 % (ref 0–0.5)
IMM GRANULOCYTES NFR BLD AUTO: 0.4 % (ref 0–0.5)
IMM GRANULOCYTES NFR BLD AUTO: 0.5 % (ref 0–0.5)
IMM GRANULOCYTES NFR BLD AUTO: 0.5 % (ref 0–0.5)
IMM GRANULOCYTES NFR BLD AUTO: 0.6 % (ref 0–0.5)
INR PPP: 1.1 (ref 0.9–1.1)
IRON 24H UR-MRATE: 21 MCG/DL (ref 37–145)
IRON SATN MFR SERPL: 9 % (ref 20–50)
KETONES UR QL STRIP: ABNORMAL
LEUKOCYTE ESTERASE UR QL STRIP.AUTO: ABNORMAL
LEUKOCYTE ESTERASE UR QL STRIP.AUTO: ABNORMAL
LEUKOCYTE ESTERASE UR QL STRIP.AUTO: NEGATIVE
LIPASE SERPL-CCNC: 18 U/L (ref 13–60)
LYMPHOCYTES # BLD AUTO: 0.73 10*3/MM3 (ref 0.7–3.1)
LYMPHOCYTES # BLD AUTO: 1.06 10*3/MM3 (ref 0.7–3.1)
LYMPHOCYTES # BLD AUTO: 1.24 10*3/MM3 (ref 0.7–3.1)
LYMPHOCYTES # BLD AUTO: 1.29 10*3/MM3 (ref 0.7–3.1)
LYMPHOCYTES # BLD AUTO: 1.37 10*3/MM3 (ref 0.7–3.1)
LYMPHOCYTES # BLD AUTO: 1.45 10*3/MM3 (ref 0.7–3.1)
LYMPHOCYTES # BLD AUTO: 1.56 10*3/MM3 (ref 0.7–3.1)
LYMPHOCYTES # BLD AUTO: 1.64 10*3/MM3 (ref 0.7–3.1)
LYMPHOCYTES # BLD AUTO: 1.93 10*3/MM3 (ref 0.7–3.1)
LYMPHOCYTES NFR BLD AUTO: 12.2 % (ref 19.6–45.3)
LYMPHOCYTES NFR BLD AUTO: 13.7 % (ref 19.6–45.3)
LYMPHOCYTES NFR BLD AUTO: 15.1 % (ref 19.6–45.3)
LYMPHOCYTES NFR BLD AUTO: 17 % (ref 19.6–45.3)
LYMPHOCYTES NFR BLD AUTO: 17.6 % (ref 19.6–45.3)
LYMPHOCYTES NFR BLD AUTO: 18.8 % (ref 19.6–45.3)
LYMPHOCYTES NFR BLD AUTO: 20.1 % (ref 19.6–45.3)
LYMPHOCYTES NFR BLD AUTO: 21.8 % (ref 19.6–45.3)
LYMPHOCYTES NFR BLD AUTO: 9.3 % (ref 19.6–45.3)
M PNEUMO IGG SER IA-ACNC: NOT DETECTED
MAGNESIUM SERPL-MCNC: 1.6 MG/DL (ref 1.6–2.4)
MAGNESIUM SERPL-MCNC: 1.7 MG/DL (ref 1.6–2.4)
MAGNESIUM SERPL-MCNC: 1.8 MG/DL (ref 1.6–2.4)
MAGNESIUM SERPL-MCNC: 1.9 MG/DL (ref 1.6–2.4)
MAGNESIUM SERPL-MCNC: 2.3 MG/DL (ref 1.6–2.4)
MAGNESIUM SERPL-MCNC: 2.6 MG/DL (ref 1.6–2.4)
MAGNESIUM SERPL-MCNC: 3.4 MG/DL (ref 1.6–2.4)
MCH RBC QN AUTO: 27.9 PG (ref 26.6–33)
MCH RBC QN AUTO: 28.1 PG (ref 26.6–33)
MCH RBC QN AUTO: 28.5 PG (ref 26.6–33)
MCH RBC QN AUTO: 28.7 PG (ref 26.6–33)
MCH RBC QN AUTO: 28.8 PG (ref 26.6–33)
MCH RBC QN AUTO: 28.8 PG (ref 26.6–33)
MCH RBC QN AUTO: 28.9 PG (ref 26.6–33)
MCH RBC QN AUTO: 29.1 PG (ref 26.6–33)
MCH RBC QN AUTO: 29.1 PG (ref 26.6–33)
MCH RBC QN AUTO: 29.2 PG (ref 26.6–33)
MCH RBC QN AUTO: 29.2 PG (ref 26.6–33)
MCHC RBC AUTO-ENTMCNC: 32 G/DL (ref 31.5–35.7)
MCHC RBC AUTO-ENTMCNC: 32 G/DL (ref 31.5–35.7)
MCHC RBC AUTO-ENTMCNC: 32.6 G/DL (ref 31.5–35.7)
MCHC RBC AUTO-ENTMCNC: 33 G/DL (ref 31.5–35.7)
MCHC RBC AUTO-ENTMCNC: 33.1 G/DL (ref 31.5–35.7)
MCHC RBC AUTO-ENTMCNC: 33.1 G/DL (ref 31.5–35.7)
MCHC RBC AUTO-ENTMCNC: 33.4 G/DL (ref 31.5–35.7)
MCHC RBC AUTO-ENTMCNC: 33.5 G/DL (ref 31.5–35.7)
MCHC RBC AUTO-ENTMCNC: 33.7 G/DL (ref 31.5–35.7)
MCV RBC AUTO: 83.2 FL (ref 79–97)
MCV RBC AUTO: 84.1 FL (ref 79–97)
MCV RBC AUTO: 85.2 FL (ref 79–97)
MCV RBC AUTO: 86 FL (ref 79–97)
MCV RBC AUTO: 86 FL (ref 79–97)
MCV RBC AUTO: 86.7 FL (ref 79–97)
MCV RBC AUTO: 86.7 FL (ref 79–97)
MCV RBC AUTO: 87.2 FL (ref 79–97)
MCV RBC AUTO: 88.1 FL (ref 79–97)
MCV RBC AUTO: 89.6 FL (ref 79–97)
METHADONE UR QL SCN: NEGATIVE
MONOCYTES # BLD AUTO: 0.69 10*3/MM3 (ref 0.1–0.9)
MONOCYTES # BLD AUTO: 0.81 10*3/MM3 (ref 0.1–0.9)
MONOCYTES # BLD AUTO: 0.9 10*3/MM3 (ref 0.1–0.9)
MONOCYTES # BLD AUTO: 0.94 10*3/MM3 (ref 0.1–0.9)
MONOCYTES # BLD AUTO: 1.03 10*3/MM3 (ref 0.1–0.9)
MONOCYTES # BLD AUTO: 1.04 10*3/MM3 (ref 0.1–0.9)
MONOCYTES # BLD AUTO: 1.06 10*3/MM3 (ref 0.1–0.9)
MONOCYTES # BLD AUTO: 1.13 10*3/MM3 (ref 0.1–0.9)
MONOCYTES # BLD AUTO: 1.14 10*3/MM3 (ref 0.1–0.9)
MONOCYTES NFR BLD AUTO: 10.4 % (ref 5–12)
MONOCYTES NFR BLD AUTO: 10.7 % (ref 5–12)
MONOCYTES NFR BLD AUTO: 10.9 % (ref 5–12)
MONOCYTES NFR BLD AUTO: 11.5 % (ref 5–12)
MONOCYTES NFR BLD AUTO: 11.6 % (ref 5–12)
MONOCYTES NFR BLD AUTO: 11.7 % (ref 5–12)
MONOCYTES NFR BLD AUTO: 13.4 % (ref 5–12)
MONOCYTES NFR BLD AUTO: 15 % (ref 5–12)
MONOCYTES NFR BLD AUTO: 8.8 % (ref 5–12)
NEUTROPHILS NFR BLD AUTO: 4.42 10*3/MM3 (ref 1.7–7)
NEUTROPHILS NFR BLD AUTO: 4.93 10*3/MM3 (ref 1.7–7)
NEUTROPHILS NFR BLD AUTO: 5 10*3/MM3 (ref 1.7–7)
NEUTROPHILS NFR BLD AUTO: 5.26 10*3/MM3 (ref 1.7–7)
NEUTROPHILS NFR BLD AUTO: 58.5 % (ref 42.7–76)
NEUTROPHILS NFR BLD AUTO: 6.33 10*3/MM3 (ref 1.7–7)
NEUTROPHILS NFR BLD AUTO: 6.34 10*3/MM3 (ref 1.7–7)
NEUTROPHILS NFR BLD AUTO: 6.36 10*3/MM3 (ref 1.7–7)
NEUTROPHILS NFR BLD AUTO: 6.43 10*3/MM3 (ref 1.7–7)
NEUTROPHILS NFR BLD AUTO: 63.9 % (ref 42.7–76)
NEUTROPHILS NFR BLD AUTO: 64.4 % (ref 42.7–76)
NEUTROPHILS NFR BLD AUTO: 69.3 % (ref 42.7–76)
NEUTROPHILS NFR BLD AUTO: 69.7 % (ref 42.7–76)
NEUTROPHILS NFR BLD AUTO: 69.8 % (ref 42.7–76)
NEUTROPHILS NFR BLD AUTO: 7.67 10*3/MM3 (ref 1.7–7)
NEUTROPHILS NFR BLD AUTO: 71.3 % (ref 42.7–76)
NEUTROPHILS NFR BLD AUTO: 73.5 % (ref 42.7–76)
NEUTROPHILS NFR BLD AUTO: 81 % (ref 42.7–76)
NITRITE UR QL STRIP: NEGATIVE
NRBC BLD AUTO-RTO: 0 /100 WBC (ref 0–0.2)
NRBC BLD AUTO-RTO: 0.1 /100 WBC (ref 0–0.2)
OPIATES UR QL: NEGATIVE
OXYCODONE UR QL SCN: NEGATIVE
PH UR STRIP.AUTO: 5.5 [PH] (ref 5–8)
PLATELET # BLD AUTO: 162 10*3/MM3 (ref 140–450)
PLATELET # BLD AUTO: 169 10*3/MM3 (ref 140–450)
PLATELET # BLD AUTO: 174 10*3/MM3 (ref 140–450)
PLATELET # BLD AUTO: 194 10*3/MM3 (ref 140–450)
PLATELET # BLD AUTO: 197 10*3/MM3 (ref 140–450)
PLATELET # BLD AUTO: 206 10*3/MM3 (ref 140–450)
PLATELET # BLD AUTO: 214 10*3/MM3 (ref 140–450)
PLATELET # BLD AUTO: 215 10*3/MM3 (ref 140–450)
PLATELET # BLD AUTO: 222 10*3/MM3 (ref 140–450)
PLATELET # BLD AUTO: 222 10*3/MM3 (ref 140–450)
PLATELET # BLD AUTO: 226 10*3/MM3 (ref 140–450)
PLATELET # BLD AUTO: 227 10*3/MM3 (ref 140–450)
PLATELET # BLD AUTO: 236 10*3/MM3 (ref 140–450)
PMV BLD AUTO: 10 FL (ref 6–12)
PMV BLD AUTO: 10.1 FL (ref 6–12)
PMV BLD AUTO: 10.1 FL (ref 6–12)
PMV BLD AUTO: 10.2 FL (ref 6–12)
PMV BLD AUTO: 10.3 FL (ref 6–12)
PMV BLD AUTO: 10.4 FL (ref 6–12)
PMV BLD AUTO: 10.5 FL (ref 6–12)
PMV BLD AUTO: 10.8 FL (ref 6–12)
POTASSIUM SERPL-SCNC: 3.2 MMOL/L (ref 3.5–5.2)
POTASSIUM SERPL-SCNC: 3.2 MMOL/L (ref 3.5–5.2)
POTASSIUM SERPL-SCNC: 3.3 MMOL/L (ref 3.5–5.2)
POTASSIUM SERPL-SCNC: 3.4 MMOL/L (ref 3.5–5.2)
POTASSIUM SERPL-SCNC: 3.5 MMOL/L (ref 3.5–5.2)
POTASSIUM SERPL-SCNC: 3.7 MMOL/L (ref 3.5–5.2)
POTASSIUM SERPL-SCNC: 3.7 MMOL/L (ref 3.5–5.2)
POTASSIUM SERPL-SCNC: 3.8 MMOL/L (ref 3.5–5.2)
POTASSIUM SERPL-SCNC: 3.9 MMOL/L (ref 3.5–5.2)
POTASSIUM SERPL-SCNC: 4 MMOL/L (ref 3.5–5.2)
POTASSIUM SERPL-SCNC: 4.1 MMOL/L (ref 3.5–5.2)
PROT SERPL-MCNC: 6.2 G/DL (ref 6–8.5)
PROT SERPL-MCNC: 6.8 G/DL (ref 6–8.5)
PROT SERPL-MCNC: 7.1 G/DL (ref 6–8.5)
PROT SERPL-MCNC: 7.5 G/DL (ref 6–8.5)
PROT UR QL STRIP: ABNORMAL
PROT UR QL STRIP: ABNORMAL
PROT UR QL STRIP: NEGATIVE
PROTHROMBIN TIME: 14.2 SECONDS (ref 11.7–14.2)
QT INTERVAL: 464 MS
QT INTERVAL: 473 MS
QT INTERVAL: 498 MS
RBC # BLD AUTO: 3.83 10*6/MM3 (ref 3.77–5.28)
RBC # BLD AUTO: 3.88 10*6/MM3 (ref 3.77–5.28)
RBC # BLD AUTO: 4.06 10*6/MM3 (ref 3.77–5.28)
RBC # BLD AUTO: 4.16 10*6/MM3 (ref 3.77–5.28)
RBC # BLD AUTO: 4.3 10*6/MM3 (ref 3.77–5.28)
RBC # BLD AUTO: 4.42 10*6/MM3 (ref 3.77–5.28)
RBC # BLD AUTO: 4.45 10*6/MM3 (ref 3.77–5.28)
RBC # BLD AUTO: 4.5 10*6/MM3 (ref 3.77–5.28)
RBC # BLD AUTO: 4.63 10*6/MM3 (ref 3.77–5.28)
RBC # BLD AUTO: 4.69 10*6/MM3 (ref 3.77–5.28)
RBC # BLD AUTO: 4.73 10*6/MM3 (ref 3.77–5.28)
RBC # BLD AUTO: 4.94 10*6/MM3 (ref 3.77–5.28)
RBC # BLD AUTO: 5.14 10*6/MM3 (ref 3.77–5.28)
RBC # UR STRIP: ABNORMAL /HPF
REF LAB TEST METHOD: ABNORMAL
RHINOVIRUS RNA SPEC NAA+PROBE: NOT DETECTED
RSV RNA NPH QL NAA+NON-PROBE: NOT DETECTED
SARS-COV-2 ORF1AB RESP QL NAA+PROBE: NOT DETECTED
SARS-COV-2 RNA NPH QL NAA+NON-PROBE: NOT DETECTED
SARS-COV-2 RNA PNL SPEC NAA+PROBE: NOT DETECTED
SODIUM SERPL-SCNC: 136 MMOL/L (ref 136–145)
SODIUM SERPL-SCNC: 138 MMOL/L (ref 136–145)
SODIUM SERPL-SCNC: 139 MMOL/L (ref 136–145)
SODIUM SERPL-SCNC: 140 MMOL/L (ref 136–145)
SODIUM SERPL-SCNC: 141 MMOL/L (ref 136–145)
SODIUM SERPL-SCNC: 143 MMOL/L (ref 136–145)
SODIUM SERPL-SCNC: 144 MMOL/L (ref 136–145)
SP GR UR STRIP: 1.01 (ref 1–1.03)
SP GR UR STRIP: 1.02 (ref 1–1.03)
SP GR UR STRIP: 1.02 (ref 1–1.03)
SQUAMOUS #/AREA URNS HPF: ABNORMAL /HPF
TIBC SERPL-MCNC: 224 MCG/DL (ref 298–536)
TRANSFERRIN SERPL-MCNC: 150 MG/DL (ref 200–360)
TROPONIN T SERPL-MCNC: <0.01 NG/ML (ref 0–0.03)
TSH SERPL DL<=0.05 MIU/L-ACNC: 11.3 UIU/ML (ref 0.27–4.2)
TSH SERPL DL<=0.05 MIU/L-ACNC: 7.64 UIU/ML (ref 0.27–4.2)
UROBILINOGEN UR QL STRIP: ABNORMAL
VIT B12 BLD-MCNC: 385 PG/ML (ref 211–946)
WBC # UR STRIP: ABNORMAL /HPF
WBC CLUMPS # UR AUTO: ABNORMAL /HPF
WBC NRBC COR # BLD: 10.99 10*3/MM3 (ref 3.4–10.8)
WBC NRBC COR # BLD: 11.02 10*3/MM3 (ref 3.4–10.8)
WBC NRBC COR # BLD: 7.43 10*3/MM3 (ref 3.4–10.8)
WBC NRBC COR # BLD: 7.54 10*3/MM3 (ref 3.4–10.8)
WBC NRBC COR # BLD: 7.59 10*3/MM3 (ref 3.4–10.8)
WBC NRBC COR # BLD: 7.71 10*3/MM3 (ref 3.4–10.8)
WBC NRBC COR # BLD: 7.76 10*3/MM3 (ref 3.4–10.8)
WBC NRBC COR # BLD: 7.81 10*3/MM3 (ref 3.4–10.8)
WBC NRBC COR # BLD: 8.63 10*3/MM3 (ref 3.4–10.8)
WBC NRBC COR # BLD: 8.66 10*3/MM3 (ref 3.4–10.8)
WBC NRBC COR # BLD: 9.02 10*3/MM3 (ref 3.4–10.8)
WBC NRBC COR # BLD: 9.08 10*3/MM3 (ref 3.4–10.8)
WBC NRBC COR # BLD: 9.87 10*3/MM3 (ref 3.4–10.8)

## 2022-01-01 PROCEDURE — 83735 ASSAY OF MAGNESIUM: CPT | Performed by: INTERNAL MEDICINE

## 2022-01-01 PROCEDURE — 99231 SBSQ HOSP IP/OBS SF/LOW 25: CPT | Performed by: INTERNAL MEDICINE

## 2022-01-01 PROCEDURE — 85025 COMPLETE CBC W/AUTO DIFF WBC: CPT | Performed by: EMERGENCY MEDICINE

## 2022-01-01 PROCEDURE — 82306 VITAMIN D 25 HYDROXY: CPT | Performed by: HOSPITALIST

## 2022-01-01 PROCEDURE — 80048 BASIC METABOLIC PNL TOTAL CA: CPT | Performed by: INTERNAL MEDICINE

## 2022-01-01 PROCEDURE — 80307 DRUG TEST PRSMV CHEM ANLYZR: CPT | Performed by: EMERGENCY MEDICINE

## 2022-01-01 PROCEDURE — 25010000002 CEFTRIAXONE PER 250 MG: Performed by: INTERNAL MEDICINE

## 2022-01-01 PROCEDURE — 25010000002 MORPHINE PER 10 MG: Performed by: HOSPITALIST

## 2022-01-01 PROCEDURE — 83735 ASSAY OF MAGNESIUM: CPT | Performed by: EMERGENCY MEDICINE

## 2022-01-01 PROCEDURE — 93010 ELECTROCARDIOGRAM REPORT: CPT | Performed by: INTERNAL MEDICINE

## 2022-01-01 PROCEDURE — 99222 1ST HOSP IP/OBS MODERATE 55: CPT | Performed by: INTERNAL MEDICINE

## 2022-01-01 PROCEDURE — 97162 PT EVAL MOD COMPLEX 30 MIN: CPT

## 2022-01-01 PROCEDURE — 70450 CT HEAD/BRAIN W/O DYE: CPT

## 2022-01-01 PROCEDURE — 99221 1ST HOSP IP/OBS SF/LOW 40: CPT | Performed by: INTERNAL MEDICINE

## 2022-01-01 PROCEDURE — 85025 COMPLETE CBC W/AUTO DIFF WBC: CPT | Performed by: INTERNAL MEDICINE

## 2022-01-01 PROCEDURE — 99232 SBSQ HOSP IP/OBS MODERATE 35: CPT | Performed by: INTERNAL MEDICINE

## 2022-01-01 PROCEDURE — 83735 ASSAY OF MAGNESIUM: CPT | Performed by: HOSPITALIST

## 2022-01-01 PROCEDURE — 85027 COMPLETE CBC AUTOMATED: CPT | Performed by: INTERNAL MEDICINE

## 2022-01-01 PROCEDURE — 99283 EMERGENCY DEPT VISIT LOW MDM: CPT

## 2022-01-01 PROCEDURE — 87186 SC STD MICRODIL/AGAR DIL: CPT | Performed by: EMERGENCY MEDICINE

## 2022-01-01 PROCEDURE — 72125 CT NECK SPINE W/O DYE: CPT

## 2022-01-01 PROCEDURE — 82077 ASSAY SPEC XCP UR&BREATH IA: CPT | Performed by: EMERGENCY MEDICINE

## 2022-01-01 PROCEDURE — 25010000002 KETOROLAC TROMETHAMINE PER 15 MG: Performed by: EMERGENCY MEDICINE

## 2022-01-01 PROCEDURE — 93005 ELECTROCARDIOGRAM TRACING: CPT | Performed by: INTERNAL MEDICINE

## 2022-01-01 PROCEDURE — P9612 CATHETERIZE FOR URINE SPEC: HCPCS

## 2022-01-01 PROCEDURE — 82728 ASSAY OF FERRITIN: CPT | Performed by: HOSPITALIST

## 2022-01-01 PROCEDURE — G0378 HOSPITAL OBSERVATION PER HR: HCPCS

## 2022-01-01 PROCEDURE — 99232 SBSQ HOSP IP/OBS MODERATE 35: CPT | Performed by: NURSE PRACTITIONER

## 2022-01-01 PROCEDURE — 96361 HYDRATE IV INFUSION ADD-ON: CPT

## 2022-01-01 PROCEDURE — 97535 SELF CARE MNGMENT TRAINING: CPT

## 2022-01-01 PROCEDURE — 99231 SBSQ HOSP IP/OBS SF/LOW 25: CPT | Performed by: PSYCHIATRY & NEUROLOGY

## 2022-01-01 PROCEDURE — 84132 ASSAY OF SERUM POTASSIUM: CPT | Performed by: HOSPITALIST

## 2022-01-01 PROCEDURE — 87086 URINE CULTURE/COLONY COUNT: CPT | Performed by: EMERGENCY MEDICINE

## 2022-01-01 PROCEDURE — 87077 CULTURE AEROBIC IDENTIFY: CPT | Performed by: EMERGENCY MEDICINE

## 2022-01-01 PROCEDURE — 25010000002 LORAZEPAM PER 2 MG: Performed by: INTERNAL MEDICINE

## 2022-01-01 PROCEDURE — 84132 ASSAY OF SERUM POTASSIUM: CPT | Performed by: INTERNAL MEDICINE

## 2022-01-01 PROCEDURE — 25010000002 MORPHINE PER 10 MG: Performed by: INTERNAL MEDICINE

## 2022-01-01 PROCEDURE — 83735 ASSAY OF MAGNESIUM: CPT | Performed by: NURSE PRACTITIONER

## 2022-01-01 PROCEDURE — 25010000002 CEFTRIAXONE PER 250 MG: Performed by: EMERGENCY MEDICINE

## 2022-01-01 PROCEDURE — 80053 COMPREHEN METABOLIC PANEL: CPT | Performed by: EMERGENCY MEDICINE

## 2022-01-01 PROCEDURE — 84466 ASSAY OF TRANSFERRIN: CPT | Performed by: HOSPITALIST

## 2022-01-01 PROCEDURE — 97110 THERAPEUTIC EXERCISES: CPT

## 2022-01-01 PROCEDURE — 81001 URINALYSIS AUTO W/SCOPE: CPT | Performed by: EMERGENCY MEDICINE

## 2022-01-01 PROCEDURE — 97530 THERAPEUTIC ACTIVITIES: CPT

## 2022-01-01 PROCEDURE — 87635 SARS-COV-2 COVID-19 AMP PRB: CPT | Performed by: EMERGENCY MEDICINE

## 2022-01-01 PROCEDURE — 36415 COLL VENOUS BLD VENIPUNCTURE: CPT | Performed by: NURSE PRACTITIONER

## 2022-01-01 PROCEDURE — 93005 ELECTROCARDIOGRAM TRACING: CPT | Performed by: NURSE PRACTITIONER

## 2022-01-01 PROCEDURE — 82550 ASSAY OF CK (CPK): CPT | Performed by: EMERGENCY MEDICINE

## 2022-01-01 PROCEDURE — 82607 VITAMIN B-12: CPT | Performed by: HOSPITALIST

## 2022-01-01 PROCEDURE — 99222 1ST HOSP IP/OBS MODERATE 55: CPT | Performed by: STUDENT IN AN ORGANIZED HEALTH CARE EDUCATION/TRAINING PROGRAM

## 2022-01-01 PROCEDURE — 85025 COMPLETE CBC W/AUTO DIFF WBC: CPT | Performed by: HOSPITALIST

## 2022-01-01 PROCEDURE — 85610 PROTHROMBIN TIME: CPT | Performed by: NURSE PRACTITIONER

## 2022-01-01 PROCEDURE — 99285 EMERGENCY DEPT VISIT HI MDM: CPT

## 2022-01-01 PROCEDURE — 25010000002 ONDANSETRON PER 1 MG: Performed by: EMERGENCY MEDICINE

## 2022-01-01 PROCEDURE — 72131 CT LUMBAR SPINE W/O DYE: CPT

## 2022-01-01 PROCEDURE — 83690 ASSAY OF LIPASE: CPT | Performed by: EMERGENCY MEDICINE

## 2022-01-01 PROCEDURE — 0202U NFCT DS 22 TRGT SARS-COV-2: CPT | Performed by: INTERNAL MEDICINE

## 2022-01-01 PROCEDURE — 84484 ASSAY OF TROPONIN QUANT: CPT | Performed by: INTERNAL MEDICINE

## 2022-01-01 PROCEDURE — 83605 ASSAY OF LACTIC ACID: CPT | Performed by: EMERGENCY MEDICINE

## 2022-01-01 PROCEDURE — 83540 ASSAY OF IRON: CPT | Performed by: HOSPITALIST

## 2022-01-01 PROCEDURE — 99239 HOSP IP/OBS DSCHRG MGMT >30: CPT | Performed by: INTERNAL MEDICINE

## 2022-01-01 PROCEDURE — 0 POTASSIUM CHLORIDE 10 MEQ/100ML SOLUTION: Performed by: INTERNAL MEDICINE

## 2022-01-01 PROCEDURE — 80048 BASIC METABOLIC PNL TOTAL CA: CPT | Performed by: NURSE PRACTITIONER

## 2022-01-01 PROCEDURE — 90791 PSYCH DIAGNOSTIC EVALUATION: CPT

## 2022-01-01 PROCEDURE — 85025 COMPLETE CBC W/AUTO DIFF WBC: CPT | Performed by: NURSE PRACTITIONER

## 2022-01-01 PROCEDURE — 97166 OT EVAL MOD COMPLEX 45 MIN: CPT

## 2022-01-01 PROCEDURE — 0 POTASSIUM CHLORIDE 10 MEQ/100ML SOLUTION: Performed by: HOSPITALIST

## 2022-01-01 PROCEDURE — 25010000002 MORPHINE PER 10 MG: Performed by: EMERGENCY MEDICINE

## 2022-01-01 PROCEDURE — 84484 ASSAY OF TROPONIN QUANT: CPT | Performed by: EMERGENCY MEDICINE

## 2022-01-01 PROCEDURE — 25010000002 LORAZEPAM PER 2 MG: Performed by: NURSE PRACTITIONER

## 2022-01-01 PROCEDURE — 74176 CT ABD & PELVIS W/O CONTRAST: CPT

## 2022-01-01 PROCEDURE — 25010000002 CEFAZOLIN IN DEXTROSE 2-4 GM/100ML-% SOLUTION: Performed by: INTERNAL MEDICINE

## 2022-01-01 PROCEDURE — 97116 GAIT TRAINING THERAPY: CPT

## 2022-01-01 PROCEDURE — 25010000002 SODIUM CHLORIDE 0.9 % WITH KCL 20 MEQ 20-0.9 MEQ/L-% SOLUTION: Performed by: INTERNAL MEDICINE

## 2022-01-01 PROCEDURE — 93005 ELECTROCARDIOGRAM TRACING: CPT | Performed by: EMERGENCY MEDICINE

## 2022-01-01 PROCEDURE — 82962 GLUCOSE BLOOD TEST: CPT

## 2022-01-01 PROCEDURE — 25010000002 HYDROMORPHONE PER 4 MG: Performed by: INTERNAL MEDICINE

## 2022-01-01 PROCEDURE — 25010000002 HYDROMORPHONE 1 MG/ML SOLUTION: Performed by: INTERNAL MEDICINE

## 2022-01-01 PROCEDURE — 82746 ASSAY OF FOLIC ACID SERUM: CPT | Performed by: HOSPITALIST

## 2022-01-01 PROCEDURE — 80053 COMPREHEN METABOLIC PANEL: CPT | Performed by: HOSPITALIST

## 2022-01-01 PROCEDURE — 84443 ASSAY THYROID STIM HORMONE: CPT | Performed by: EMERGENCY MEDICINE

## 2022-01-01 PROCEDURE — 83036 HEMOGLOBIN GLYCOSYLATED A1C: CPT | Performed by: INTERNAL MEDICINE

## 2022-01-01 PROCEDURE — 99284 EMERGENCY DEPT VISIT MOD MDM: CPT

## 2022-01-01 PROCEDURE — 85027 COMPLETE CBC AUTOMATED: CPT | Performed by: NURSE PRACTITIONER

## 2022-01-01 PROCEDURE — 96365 THER/PROPH/DIAG IV INF INIT: CPT

## 2022-01-01 PROCEDURE — 25010000002 CEFTRIAXONE PER 250 MG: Performed by: HOSPITALIST

## 2022-01-01 PROCEDURE — U0004 COV-19 TEST NON-CDC HGH THRU: HCPCS | Performed by: EMERGENCY MEDICINE

## 2022-01-01 PROCEDURE — 84443 ASSAY THYROID STIM HORMONE: CPT | Performed by: HOSPITALIST

## 2022-01-01 PROCEDURE — 92610 EVALUATE SWALLOWING FUNCTION: CPT | Performed by: SPEECH-LANGUAGE PATHOLOGIST

## 2022-01-01 PROCEDURE — U0005 INFEC AGEN DETEC AMPLI PROBE: HCPCS | Performed by: EMERGENCY MEDICINE

## 2022-01-01 PROCEDURE — 99222 1ST HOSP IP/OBS MODERATE 55: CPT | Performed by: NURSE PRACTITIONER

## 2022-01-01 PROCEDURE — 71045 X-RAY EXAM CHEST 1 VIEW: CPT

## 2022-01-01 PROCEDURE — 99497 ADVNCD CARE PLAN 30 MIN: CPT | Performed by: NURSE PRACTITIONER

## 2022-01-01 PROCEDURE — 92526 ORAL FUNCTION THERAPY: CPT | Performed by: SPEECH-LANGUAGE PATHOLOGIST

## 2022-01-01 PROCEDURE — 74220 X-RAY XM ESOPHAGUS 1CNTRST: CPT

## 2022-01-01 PROCEDURE — BD11YZZ FLUOROSCOPY OF ESOPHAGUS USING OTHER CONTRAST: ICD-10-PCS | Performed by: RADIOLOGY

## 2022-01-01 PROCEDURE — 25010000002 MAGNESIUM SULFATE 2 GM/50ML SOLUTION: Performed by: HOSPITALIST

## 2022-01-01 RX ORDER — GLYCOPYRROLATE 0.2 MG/ML
0.4 INJECTION INTRAMUSCULAR; INTRAVENOUS
Status: DISCONTINUED | OUTPATIENT
Start: 2022-01-01 | End: 2022-01-01 | Stop reason: HOSPADM

## 2022-01-01 RX ORDER — ONDANSETRON 2 MG/ML
4 INJECTION INTRAMUSCULAR; INTRAVENOUS EVERY 6 HOURS PRN
Status: DISCONTINUED | OUTPATIENT
Start: 2022-01-01 | End: 2022-01-01

## 2022-01-01 RX ORDER — MORPHINE SULFATE 10 MG/ML
6 INJECTION INTRAMUSCULAR; INTRAVENOUS; SUBCUTANEOUS
Status: DISCONTINUED | OUTPATIENT
Start: 2022-01-01 | End: 2022-01-01

## 2022-01-01 RX ORDER — CEFUROXIME AXETIL 500 MG/1
500 TABLET ORAL 2 TIMES DAILY
Qty: 14 TABLET | Refills: 0 | OUTPATIENT
Start: 2022-01-01 | End: 2022-01-01 | Stop reason: SDUPTHER

## 2022-01-01 RX ORDER — LORAZEPAM 2 MG/ML
0.5 INJECTION INTRAMUSCULAR
Status: DISCONTINUED | OUTPATIENT
Start: 2022-01-01 | End: 2022-01-01

## 2022-01-01 RX ORDER — AMOXICILLIN 250 MG
2 CAPSULE ORAL 2 TIMES DAILY
Status: DISCONTINUED | OUTPATIENT
Start: 2022-01-01 | End: 2022-01-01

## 2022-01-01 RX ORDER — LORAZEPAM 2 MG/ML
0.5 INJECTION INTRAMUSCULAR
Status: CANCELLED | OUTPATIENT
Start: 2022-01-01 | End: 2022-01-01

## 2022-01-01 RX ORDER — ERGOCALCIFEROL 1.25 MG/1
50000 CAPSULE ORAL
Status: DISCONTINUED | OUTPATIENT
Start: 2022-01-01 | End: 2022-01-01

## 2022-01-01 RX ORDER — ACETAMINOPHEN 325 MG/1
650 TABLET ORAL EVERY 4 HOURS PRN
Status: DISCONTINUED | OUTPATIENT
Start: 2022-01-01 | End: 2022-01-01

## 2022-01-01 RX ORDER — ACETAMINOPHEN 325 MG/1
650 TABLET ORAL EVERY 4 HOURS PRN
Status: DISCONTINUED | OUTPATIENT
Start: 2022-01-01 | End: 2022-01-01 | Stop reason: HOSPADM

## 2022-01-01 RX ORDER — LABETALOL HYDROCHLORIDE 5 MG/ML
20 INJECTION, SOLUTION INTRAVENOUS EVERY 6 HOURS PRN
Status: DISCONTINUED | OUTPATIENT
Start: 2022-01-01 | End: 2022-01-01

## 2022-01-01 RX ORDER — ACETAMINOPHEN 160 MG/5ML
650 SOLUTION ORAL EVERY 4 HOURS PRN
Status: DISCONTINUED | OUTPATIENT
Start: 2022-01-01 | End: 2022-01-01

## 2022-01-01 RX ORDER — LORAZEPAM 2 MG/ML
2 INJECTION INTRAMUSCULAR
Status: DISCONTINUED | OUTPATIENT
Start: 2022-01-01 | End: 2022-01-01 | Stop reason: HOSPADM

## 2022-01-01 RX ORDER — MAGNESIUM SULFATE HEPTAHYDRATE 40 MG/ML
2 INJECTION, SOLUTION INTRAVENOUS ONCE
Status: COMPLETED | OUTPATIENT
Start: 2022-01-01 | End: 2022-01-01

## 2022-01-01 RX ORDER — SODIUM CHLORIDE 9 MG/ML
125 INJECTION, SOLUTION INTRAVENOUS CONTINUOUS
Status: DISCONTINUED | OUTPATIENT
Start: 2022-01-01 | End: 2022-01-01 | Stop reason: HOSPADM

## 2022-01-01 RX ORDER — ONDANSETRON 4 MG/1
4 TABLET, FILM COATED ORAL EVERY 6 HOURS PRN
Status: DISCONTINUED | OUTPATIENT
Start: 2022-01-01 | End: 2022-01-01

## 2022-01-01 RX ORDER — MORPHINE SULFATE 20 MG/ML
20 SOLUTION ORAL
Status: CANCELLED | OUTPATIENT
Start: 2022-01-01 | End: 2022-01-01

## 2022-01-01 RX ORDER — SODIUM CHLORIDE 9 MG/ML
125 INJECTION, SOLUTION INTRAVENOUS CONTINUOUS
Status: DISCONTINUED | OUTPATIENT
Start: 2022-01-01 | End: 2022-01-01

## 2022-01-01 RX ORDER — SULFAMETHOXAZOLE AND TRIMETHOPRIM 800; 160 MG/1; MG/1
1 TABLET ORAL 2 TIMES DAILY
Qty: 8 TABLET | Refills: 0 | Status: SHIPPED | OUTPATIENT
Start: 2022-01-01 | End: 2022-01-01 | Stop reason: HOSPADM

## 2022-01-01 RX ORDER — LORAZEPAM 2 MG/ML
2 CONCENTRATE ORAL
Status: DISCONTINUED | OUTPATIENT
Start: 2022-01-01 | End: 2022-01-01 | Stop reason: HOSPADM

## 2022-01-01 RX ORDER — FAMOTIDINE 20 MG/1
20 TABLET, FILM COATED ORAL
Status: DISCONTINUED | OUTPATIENT
Start: 2022-01-01 | End: 2022-01-01 | Stop reason: HOSPADM

## 2022-01-01 RX ORDER — MORPHINE SULFATE 20 MG/ML
5 SOLUTION ORAL EVERY 4 HOURS PRN
Status: DISCONTINUED | OUTPATIENT
Start: 2022-01-01 | End: 2022-01-01

## 2022-01-01 RX ORDER — MORPHINE SULFATE 20 MG/ML
5 SOLUTION ORAL
Status: DISCONTINUED | OUTPATIENT
Start: 2022-01-01 | End: 2022-01-01 | Stop reason: HOSPADM

## 2022-01-01 RX ORDER — PROPRANOLOL HYDROCHLORIDE 120 MG/1
120 CAPSULE, EXTENDED RELEASE ORAL DAILY
Qty: 30 CAPSULE | Refills: 3 | Status: SHIPPED | OUTPATIENT
Start: 2022-01-01 | End: 2022-01-01 | Stop reason: HOSPADM

## 2022-01-01 RX ORDER — LORAZEPAM 2 MG/ML
2 INJECTION INTRAMUSCULAR
Status: DISCONTINUED | OUTPATIENT
Start: 2022-01-01 | End: 2022-01-01

## 2022-01-01 RX ORDER — LEVOTHYROXINE SODIUM 0.1 MG/1
100 TABLET ORAL DAILY
Status: DISCONTINUED | OUTPATIENT
Start: 2022-01-01 | End: 2022-01-01

## 2022-01-01 RX ORDER — OLANZAPINE 2.5 MG/1
2.5 TABLET ORAL 2 TIMES DAILY
Status: DISCONTINUED | OUTPATIENT
Start: 2022-01-01 | End: 2022-01-01

## 2022-01-01 RX ORDER — SODIUM CHLORIDE 0.9 % (FLUSH) 0.9 %
10 SYRINGE (ML) INJECTION AS NEEDED
Status: DISCONTINUED | OUTPATIENT
Start: 2022-01-01 | End: 2022-01-01

## 2022-01-01 RX ORDER — LORAZEPAM 2 MG/ML
0.5 CONCENTRATE ORAL
Status: DISCONTINUED | OUTPATIENT
Start: 2022-01-01 | End: 2022-01-01 | Stop reason: HOSPADM

## 2022-01-01 RX ORDER — LEVOTHYROXINE SODIUM 0.12 MG/1
125 TABLET ORAL
Status: DISCONTINUED | OUTPATIENT
Start: 2022-01-01 | End: 2022-01-01

## 2022-01-01 RX ORDER — HYDROMORPHONE HYDROCHLORIDE 1 MG/ML
0.5 INJECTION, SOLUTION INTRAMUSCULAR; INTRAVENOUS; SUBCUTANEOUS
Status: DISCONTINUED | OUTPATIENT
Start: 2022-01-01 | End: 2022-01-01 | Stop reason: HOSPADM

## 2022-01-01 RX ORDER — LORAZEPAM 2 MG/ML
1 CONCENTRATE ORAL
Status: DISCONTINUED | OUTPATIENT
Start: 2022-01-01 | End: 2022-01-01 | Stop reason: HOSPADM

## 2022-01-01 RX ORDER — POTASSIUM CHLORIDE 750 MG/1
20 TABLET, FILM COATED, EXTENDED RELEASE ORAL ONCE
Status: DISCONTINUED | OUTPATIENT
Start: 2022-01-01 | End: 2022-01-01

## 2022-01-01 RX ORDER — LORAZEPAM 2 MG/ML
1 INJECTION INTRAMUSCULAR
Status: DISCONTINUED | OUTPATIENT
Start: 2022-01-01 | End: 2022-01-01 | Stop reason: HOSPADM

## 2022-01-01 RX ORDER — HYDROMORPHONE HCL 110MG/55ML
1.5 PATIENT CONTROLLED ANALGESIA SYRINGE INTRAVENOUS
Status: CANCELLED | OUTPATIENT
Start: 2022-01-01 | End: 2022-01-01

## 2022-01-01 RX ORDER — HYDROMORPHONE HCL 110MG/55ML
1.5 PATIENT CONTROLLED ANALGESIA SYRINGE INTRAVENOUS
Status: DISCONTINUED | OUTPATIENT
Start: 2022-01-01 | End: 2022-01-01 | Stop reason: HOSPADM

## 2022-01-01 RX ORDER — SODIUM CHLORIDE 0.9 % (FLUSH) 0.9 %
10 SYRINGE (ML) INJECTION EVERY 12 HOURS SCHEDULED
Status: DISCONTINUED | OUTPATIENT
Start: 2022-01-01 | End: 2022-01-01 | Stop reason: HOSPADM

## 2022-01-01 RX ORDER — ACETAMINOPHEN 650 MG/1
650 SUPPOSITORY RECTAL EVERY 4 HOURS PRN
Status: DISCONTINUED | OUTPATIENT
Start: 2022-01-01 | End: 2022-01-01 | Stop reason: HOSPADM

## 2022-01-01 RX ORDER — ALUMINA, MAGNESIA, AND SIMETHICONE 2400; 2400; 240 MG/30ML; MG/30ML; MG/30ML
15 SUSPENSION ORAL EVERY 6 HOURS PRN
Status: CANCELLED | OUTPATIENT
Start: 2022-01-01

## 2022-01-01 RX ORDER — KETOROLAC TROMETHAMINE 15 MG/ML
10 INJECTION, SOLUTION INTRAMUSCULAR; INTRAVENOUS ONCE
Status: COMPLETED | OUTPATIENT
Start: 2022-01-01 | End: 2022-01-01

## 2022-01-01 RX ORDER — HYDROMORPHONE HYDROCHLORIDE 1 MG/ML
0.5 INJECTION, SOLUTION INTRAMUSCULAR; INTRAVENOUS; SUBCUTANEOUS
Status: DISCONTINUED | OUTPATIENT
Start: 2022-01-01 | End: 2022-01-01

## 2022-01-01 RX ORDER — MORPHINE SULFATE 20 MG/ML
10 SOLUTION ORAL
Status: DISCONTINUED | OUTPATIENT
Start: 2022-01-01 | End: 2022-01-01 | Stop reason: HOSPADM

## 2022-01-01 RX ORDER — MORPHINE SULFATE 20 MG/ML
20 SOLUTION ORAL
Status: DISCONTINUED | OUTPATIENT
Start: 2022-01-01 | End: 2022-01-01 | Stop reason: HOSPADM

## 2022-01-01 RX ORDER — POTASSIUM CHLORIDE 750 MG/1
40 TABLET, FILM COATED, EXTENDED RELEASE ORAL AS NEEDED
Status: DISCONTINUED | OUTPATIENT
Start: 2022-01-01 | End: 2022-01-01

## 2022-01-01 RX ORDER — MORPHINE SULFATE 20 MG/ML
5 SOLUTION ORAL
Status: DISCONTINUED | OUTPATIENT
Start: 2022-01-01 | End: 2022-01-01

## 2022-01-01 RX ORDER — LORAZEPAM 2 MG/ML
0.5 CONCENTRATE ORAL
Status: DISCONTINUED | OUTPATIENT
Start: 2022-01-01 | End: 2022-01-01

## 2022-01-01 RX ORDER — SODIUM CHLORIDE AND POTASSIUM CHLORIDE 150; 900 MG/100ML; MG/100ML
75 INJECTION, SOLUTION INTRAVENOUS CONTINUOUS
Status: DISCONTINUED | OUTPATIENT
Start: 2022-01-01 | End: 2022-01-01

## 2022-01-01 RX ORDER — LORAZEPAM 2 MG/ML
0.5 INJECTION INTRAMUSCULAR
Status: DISCONTINUED | OUTPATIENT
Start: 2022-01-01 | End: 2022-01-01 | Stop reason: HOSPADM

## 2022-01-01 RX ORDER — ENALAPRILAT 2.5 MG/2ML
0.62 INJECTION INTRAVENOUS EVERY 6 HOURS
Status: DISCONTINUED | OUTPATIENT
Start: 2022-01-01 | End: 2022-01-01

## 2022-01-01 RX ORDER — ENALAPRILAT 2.5 MG/2ML
1.25 INJECTION INTRAVENOUS EVERY 6 HOURS PRN
Status: DISCONTINUED | OUTPATIENT
Start: 2022-01-01 | End: 2022-01-01 | Stop reason: HOSPADM

## 2022-01-01 RX ORDER — LORAZEPAM 2 MG/ML
2 CONCENTRATE ORAL
Status: CANCELLED | OUTPATIENT
Start: 2022-01-01 | End: 2022-01-01

## 2022-01-01 RX ORDER — LORAZEPAM 2 MG/ML
1 INJECTION INTRAMUSCULAR
Status: DISCONTINUED | OUTPATIENT
Start: 2022-01-01 | End: 2022-01-01

## 2022-01-01 RX ORDER — LORAZEPAM 2 MG/ML
1 CONCENTRATE ORAL
Status: CANCELLED | OUTPATIENT
Start: 2022-01-01 | End: 2022-01-01

## 2022-01-01 RX ORDER — OLANZAPINE 2.5 MG/1
2.5 TABLET ORAL
Start: 2022-01-01

## 2022-01-01 RX ORDER — FELODIPINE 5 MG/1
10 TABLET, EXTENDED RELEASE ORAL DAILY
Status: DISCONTINUED | OUTPATIENT
Start: 2022-01-01 | End: 2022-01-01 | Stop reason: HOSPADM

## 2022-01-01 RX ORDER — POTASSIUM CHLORIDE 7.45 MG/ML
10 INJECTION INTRAVENOUS
Status: DISCONTINUED | OUTPATIENT
Start: 2022-01-01 | End: 2022-01-01

## 2022-01-01 RX ORDER — LORAZEPAM 2 MG/ML
1 INJECTION INTRAMUSCULAR
Status: CANCELLED | OUTPATIENT
Start: 2022-01-01 | End: 2022-01-01

## 2022-01-01 RX ORDER — PROPRANOLOL HYDROCHLORIDE 80 MG/1
80 CAPSULE, EXTENDED RELEASE ORAL DAILY
Start: 2022-01-01

## 2022-01-01 RX ORDER — MORPHINE SULFATE 20 MG/ML
10 SOLUTION ORAL
Status: CANCELLED | OUTPATIENT
Start: 2022-01-01 | End: 2022-01-01

## 2022-01-01 RX ORDER — SODIUM CHLORIDE 9 MG/ML
100 INJECTION, SOLUTION INTRAVENOUS CONTINUOUS
Status: DISCONTINUED | OUTPATIENT
Start: 2022-01-01 | End: 2022-01-01

## 2022-01-01 RX ORDER — PROPRANOLOL HYDROCHLORIDE 80 MG/1
80 CAPSULE, EXTENDED RELEASE ORAL DAILY
Status: DISCONTINUED | OUTPATIENT
Start: 2022-01-01 | End: 2022-01-01 | Stop reason: HOSPADM

## 2022-01-01 RX ORDER — ACETAMINOPHEN 650 MG/1
650 SUPPOSITORY RECTAL EVERY 4 HOURS PRN
Status: DISCONTINUED | OUTPATIENT
Start: 2022-01-01 | End: 2022-01-01

## 2022-01-01 RX ORDER — DIPHENOXYLATE HYDROCHLORIDE AND ATROPINE SULFATE 2.5; .025 MG/1; MG/1
1 TABLET ORAL
Status: DISCONTINUED | OUTPATIENT
Start: 2022-01-01 | End: 2022-01-01

## 2022-01-01 RX ORDER — CEFTRIAXONE 1 G/1
1 INJECTION, POWDER, FOR SOLUTION INTRAMUSCULAR; INTRAVENOUS EVERY 24 HOURS
Status: DISCONTINUED | OUTPATIENT
Start: 2022-01-01 | End: 2022-01-01

## 2022-01-01 RX ORDER — LORAZEPAM 2 MG/ML
0.5 INJECTION INTRAMUSCULAR ONCE
Status: COMPLETED | OUTPATIENT
Start: 2022-01-01 | End: 2022-01-01

## 2022-01-01 RX ORDER — TRAMADOL HYDROCHLORIDE 50 MG/1
50 TABLET ORAL 2 TIMES DAILY PRN
COMMUNITY

## 2022-01-01 RX ORDER — LEVOTHYROXINE SODIUM 0.12 MG/1
125 TABLET ORAL
Status: CANCELLED | OUTPATIENT
Start: 2022-01-01

## 2022-01-01 RX ORDER — MORPHINE SULFATE 20 MG/ML
20 SOLUTION ORAL
Status: DISCONTINUED | OUTPATIENT
Start: 2022-01-01 | End: 2022-01-01

## 2022-01-01 RX ORDER — ATORVASTATIN CALCIUM 20 MG/1
40 TABLET, FILM COATED ORAL DAILY
Status: DISCONTINUED | OUTPATIENT
Start: 2022-01-01 | End: 2022-01-01 | Stop reason: HOSPADM

## 2022-01-01 RX ORDER — HYDROMORPHONE HYDROCHLORIDE 1 MG/ML
0.5 INJECTION, SOLUTION INTRAMUSCULAR; INTRAVENOUS; SUBCUTANEOUS
Status: CANCELLED | OUTPATIENT
Start: 2022-01-01 | End: 2022-01-01

## 2022-01-01 RX ORDER — MORPHINE SULFATE 20 MG/ML
10 SOLUTION ORAL
Status: DISCONTINUED | OUTPATIENT
Start: 2022-01-01 | End: 2022-01-01

## 2022-01-01 RX ORDER — LEVOTHYROXINE SODIUM 0.1 MG/1
100 TABLET ORAL
Status: DISCONTINUED | OUTPATIENT
Start: 2022-01-01 | End: 2022-01-01 | Stop reason: HOSPADM

## 2022-01-01 RX ORDER — LIDOCAINE 50 MG/G
2 PATCH TOPICAL
Status: DISCONTINUED | OUTPATIENT
Start: 2022-01-01 | End: 2022-01-01

## 2022-01-01 RX ORDER — PROPRANOLOL HYDROCHLORIDE 80 MG/1
80 CAPSULE, EXTENDED RELEASE ORAL DAILY
Status: DISCONTINUED | OUTPATIENT
Start: 2022-01-01 | End: 2022-01-01

## 2022-01-01 RX ORDER — DEXTROSE, SODIUM CHLORIDE, SODIUM LACTATE, POTASSIUM CHLORIDE, AND CALCIUM CHLORIDE 5; .6; .31; .03; .02 G/100ML; G/100ML; G/100ML; G/100ML; G/100ML
50 INJECTION, SOLUTION INTRAVENOUS CONTINUOUS
Status: DISCONTINUED | OUTPATIENT
Start: 2022-01-01 | End: 2022-01-01

## 2022-01-01 RX ORDER — GLYCOPYRROLATE 0.2 MG/ML
0.2 INJECTION INTRAMUSCULAR; INTRAVENOUS
Status: DISCONTINUED | OUTPATIENT
Start: 2022-01-01 | End: 2022-01-01 | Stop reason: HOSPADM

## 2022-01-01 RX ORDER — OLANZAPINE 2.5 MG/1
2.5 TABLET ORAL
Status: DISCONTINUED | OUTPATIENT
Start: 2022-01-01 | End: 2022-01-01 | Stop reason: HOSPADM

## 2022-01-01 RX ORDER — CEFUROXIME AXETIL 500 MG/1
500 TABLET ORAL 2 TIMES DAILY
Qty: 14 TABLET | Refills: 0 | Status: SHIPPED | OUTPATIENT
Start: 2022-01-01

## 2022-01-01 RX ORDER — ACETAMINOPHEN 160 MG/5ML
650 SOLUTION ORAL EVERY 4 HOURS PRN
Status: DISCONTINUED | OUTPATIENT
Start: 2022-01-01 | End: 2022-01-01 | Stop reason: HOSPADM

## 2022-01-01 RX ORDER — ACETAMINOPHEN 500 MG
1000 TABLET ORAL ONCE
Status: COMPLETED | OUTPATIENT
Start: 2022-01-01 | End: 2022-01-01

## 2022-01-01 RX ORDER — NITROGLYCERIN 0.4 MG/1
0.4 TABLET SUBLINGUAL
Status: DISCONTINUED | OUTPATIENT
Start: 2022-01-01 | End: 2022-01-01 | Stop reason: HOSPADM

## 2022-01-01 RX ORDER — SODIUM CHLORIDE 0.9 % (FLUSH) 0.9 %
10 SYRINGE (ML) INJECTION AS NEEDED
Status: DISCONTINUED | OUTPATIENT
Start: 2022-01-01 | End: 2022-01-01 | Stop reason: HOSPADM

## 2022-01-01 RX ORDER — SERTRALINE HYDROCHLORIDE 100 MG/1
100 TABLET, FILM COATED ORAL DAILY
Status: DISCONTINUED | OUTPATIENT
Start: 2022-01-01 | End: 2022-01-01 | Stop reason: HOSPADM

## 2022-01-01 RX ORDER — LEVOTHYROXINE SODIUM 112 UG/1
112 TABLET ORAL
Qty: 30 TABLET | Refills: 3
Start: 2022-01-01

## 2022-01-01 RX ORDER — SERTRALINE HYDROCHLORIDE 100 MG/1
100 TABLET, FILM COATED ORAL DAILY
Status: DISCONTINUED | OUTPATIENT
Start: 2022-01-01 | End: 2022-01-01

## 2022-01-01 RX ORDER — MORPHINE SULFATE 10 MG/ML
6 INJECTION INTRAMUSCULAR; INTRAVENOUS; SUBCUTANEOUS
Status: DISCONTINUED | OUTPATIENT
Start: 2022-01-01 | End: 2022-01-01 | Stop reason: HOSPADM

## 2022-01-01 RX ORDER — POTASSIUM CHLORIDE 1.5 G/1.77G
40 POWDER, FOR SOLUTION ORAL AS NEEDED
Status: DISCONTINUED | OUTPATIENT
Start: 2022-01-01 | End: 2022-01-01

## 2022-01-01 RX ORDER — ONDANSETRON 2 MG/ML
4 INJECTION INTRAMUSCULAR; INTRAVENOUS EVERY 6 HOURS PRN
Status: DISCONTINUED | OUTPATIENT
Start: 2022-01-01 | End: 2022-01-01 | Stop reason: HOSPADM

## 2022-01-01 RX ORDER — MORPHINE SULFATE 4 MG/ML
4 INJECTION, SOLUTION INTRAMUSCULAR; INTRAVENOUS
Status: DISCONTINUED | OUTPATIENT
Start: 2022-01-01 | End: 2022-01-01 | Stop reason: HOSPADM

## 2022-01-01 RX ORDER — CEFAZOLIN SODIUM 2 G/100ML
2 INJECTION, SOLUTION INTRAVENOUS EVERY 8 HOURS
Status: COMPLETED | OUTPATIENT
Start: 2022-01-01 | End: 2022-01-01

## 2022-01-01 RX ORDER — LORAZEPAM 2 MG/ML
1 CONCENTRATE ORAL
Status: DISCONTINUED | OUTPATIENT
Start: 2022-01-01 | End: 2022-01-01

## 2022-01-01 RX ORDER — OLANZAPINE 10 MG/1
5 INJECTION, POWDER, LYOPHILIZED, FOR SOLUTION INTRAMUSCULAR EVERY 8 HOURS PRN
Status: DISCONTINUED | OUTPATIENT
Start: 2022-01-01 | End: 2022-01-01 | Stop reason: HOSPADM

## 2022-01-01 RX ORDER — TRAMADOL HYDROCHLORIDE 50 MG/1
50 TABLET ORAL 2 TIMES DAILY PRN
Status: DISCONTINUED | OUTPATIENT
Start: 2022-01-01 | End: 2022-01-01

## 2022-01-01 RX ORDER — SCOLOPAMINE TRANSDERMAL SYSTEM 1 MG/1
1 PATCH, EXTENDED RELEASE TRANSDERMAL
Status: DISCONTINUED | OUTPATIENT
Start: 2022-01-01 | End: 2022-01-01 | Stop reason: HOSPADM

## 2022-01-01 RX ORDER — SODIUM CHLORIDE, SODIUM LACTATE, POTASSIUM CHLORIDE, CALCIUM CHLORIDE 600; 310; 30; 20 MG/100ML; MG/100ML; MG/100ML; MG/100ML
75 INJECTION, SOLUTION INTRAVENOUS CONTINUOUS
Status: DISCONTINUED | OUTPATIENT
Start: 2022-01-01 | End: 2022-01-01

## 2022-01-01 RX ORDER — CLONIDINE HYDROCHLORIDE 0.1 MG/1
0.1 TABLET ORAL ONCE
Status: COMPLETED | OUTPATIENT
Start: 2022-01-01 | End: 2022-01-01

## 2022-01-01 RX ORDER — MORPHINE SULFATE 2 MG/ML
2 INJECTION, SOLUTION INTRAMUSCULAR; INTRAVENOUS
Status: DISCONTINUED | OUTPATIENT
Start: 2022-01-01 | End: 2022-01-01 | Stop reason: HOSPADM

## 2022-01-01 RX ORDER — LORAZEPAM 2 MG/ML
2 CONCENTRATE ORAL
Status: DISCONTINUED | OUTPATIENT
Start: 2022-01-01 | End: 2022-01-01

## 2022-01-01 RX ORDER — MAGNESIUM SULFATE HEPTAHYDRATE 40 MG/ML
2 INJECTION, SOLUTION INTRAVENOUS AS NEEDED
Status: DISCONTINUED | OUTPATIENT
Start: 2022-01-01 | End: 2022-01-01

## 2022-01-01 RX ORDER — MORPHINE SULFATE 2 MG/ML
2 INJECTION, SOLUTION INTRAMUSCULAR; INTRAVENOUS
Status: DISCONTINUED | OUTPATIENT
Start: 2022-01-01 | End: 2022-01-01

## 2022-01-01 RX ORDER — ATORVASTATIN CALCIUM 20 MG/1
40 TABLET, FILM COATED ORAL DAILY
Status: DISCONTINUED | OUTPATIENT
Start: 2022-01-01 | End: 2022-01-01

## 2022-01-01 RX ORDER — UREA 10 %
3 LOTION (ML) TOPICAL NIGHTLY PRN
Status: DISCONTINUED | OUTPATIENT
Start: 2022-01-01 | End: 2022-01-01

## 2022-01-01 RX ORDER — LEVOTHYROXINE SODIUM 112 UG/1
112 TABLET ORAL
Status: DISCONTINUED | OUTPATIENT
Start: 2022-01-01 | End: 2022-01-01

## 2022-01-01 RX ORDER — MORPHINE SULFATE 2 MG/ML
2 INJECTION, SOLUTION INTRAMUSCULAR; INTRAVENOUS ONCE
Status: COMPLETED | OUTPATIENT
Start: 2022-01-01 | End: 2022-01-01

## 2022-01-01 RX ORDER — MORPHINE SULFATE 2 MG/ML
1 INJECTION, SOLUTION INTRAMUSCULAR; INTRAVENOUS EVERY 4 HOURS PRN
Status: DISCONTINUED | OUTPATIENT
Start: 2022-01-01 | End: 2022-01-01

## 2022-01-01 RX ORDER — ONDANSETRON 2 MG/ML
4 INJECTION INTRAMUSCULAR; INTRAVENOUS ONCE
Status: COMPLETED | OUTPATIENT
Start: 2022-01-01 | End: 2022-01-01

## 2022-01-01 RX ORDER — DEXTROSE AND SODIUM CHLORIDE 5; .45 G/100ML; G/100ML
75 INJECTION, SOLUTION INTRAVENOUS CONTINUOUS
Status: DISCONTINUED | OUTPATIENT
Start: 2022-01-01 | End: 2022-01-01

## 2022-01-01 RX ORDER — MORPHINE SULFATE 20 MG/ML
5 SOLUTION ORAL
Status: CANCELLED | OUTPATIENT
Start: 2022-01-01 | End: 2022-01-01

## 2022-01-01 RX ORDER — MORPHINE SULFATE 4 MG/ML
4 INJECTION, SOLUTION INTRAMUSCULAR; INTRAVENOUS
Status: DISCONTINUED | OUTPATIENT
Start: 2022-01-01 | End: 2022-01-01

## 2022-01-01 RX ORDER — MORPHINE SULFATE 2 MG/ML
2 INJECTION, SOLUTION INTRAMUSCULAR; INTRAVENOUS
Status: CANCELLED | OUTPATIENT
Start: 2022-01-01 | End: 2022-01-01

## 2022-01-01 RX ORDER — MORPHINE SULFATE 4 MG/ML
4 INJECTION, SOLUTION INTRAMUSCULAR; INTRAVENOUS
Status: CANCELLED | OUTPATIENT
Start: 2022-01-01 | End: 2022-01-01

## 2022-01-01 RX ORDER — FELODIPINE 5 MG/1
10 TABLET, EXTENDED RELEASE ORAL DAILY
Status: DISCONTINUED | OUTPATIENT
Start: 2022-01-01 | End: 2022-01-01

## 2022-01-01 RX ORDER — LEVOTHYROXINE SODIUM 112 UG/1
112 TABLET ORAL
Qty: 30 TABLET | Refills: 3 | Status: SHIPPED | OUTPATIENT
Start: 2022-01-01 | End: 2022-01-01 | Stop reason: SDUPTHER

## 2022-01-01 RX ORDER — MORPHINE SULFATE 2 MG/ML
1 INJECTION, SOLUTION INTRAMUSCULAR; INTRAVENOUS
Status: DISCONTINUED | OUTPATIENT
Start: 2022-01-01 | End: 2022-01-01

## 2022-01-01 RX ORDER — MAGNESIUM SULFATE 1 G/100ML
1 INJECTION INTRAVENOUS AS NEEDED
Status: DISCONTINUED | OUTPATIENT
Start: 2022-01-01 | End: 2022-01-01

## 2022-01-01 RX ORDER — MAGNESIUM SULFATE HEPTAHYDRATE 40 MG/ML
4 INJECTION, SOLUTION INTRAVENOUS AS NEEDED
Status: DISCONTINUED | OUTPATIENT
Start: 2022-01-01 | End: 2022-01-01

## 2022-01-01 RX ORDER — HYDROMORPHONE HCL 110MG/55ML
1.5 PATIENT CONTROLLED ANALGESIA SYRINGE INTRAVENOUS
Status: DISCONTINUED | OUTPATIENT
Start: 2022-01-01 | End: 2022-01-01

## 2022-01-01 RX ORDER — LORAZEPAM 2 MG/ML
0.5 CONCENTRATE ORAL EVERY 6 HOURS PRN
Status: DISCONTINUED | OUTPATIENT
Start: 2022-01-01 | End: 2022-01-01

## 2022-01-01 RX ORDER — POTASSIUM CHLORIDE 1.5 G/1.77G
40 POWDER, FOR SOLUTION ORAL EVERY 4 HOURS
Status: COMPLETED | OUTPATIENT
Start: 2022-01-01 | End: 2022-01-01

## 2022-01-01 RX ORDER — OXYCODONE HYDROCHLORIDE AND ACETAMINOPHEN 5; 325 MG/1; MG/1
1 TABLET ORAL EVERY 4 HOURS PRN
Status: DISCONTINUED | OUTPATIENT
Start: 2022-01-01 | End: 2022-01-01

## 2022-01-01 RX ORDER — LEVOTHYROXINE SODIUM 0.12 MG/1
125 TABLET ORAL
Status: DISCONTINUED | OUTPATIENT
Start: 2022-01-01 | End: 2022-01-01 | Stop reason: HOSPADM

## 2022-01-01 RX ORDER — PROPRANOLOL HCL 60 MG
120 CAPSULE, EXTENDED RELEASE 24HR ORAL DAILY
Status: DISCONTINUED | OUTPATIENT
Start: 2022-01-01 | End: 2022-01-01

## 2022-01-01 RX ORDER — ALUMINA, MAGNESIA, AND SIMETHICONE 2400; 2400; 240 MG/30ML; MG/30ML; MG/30ML
15 SUSPENSION ORAL EVERY 6 HOURS PRN
Status: DISCONTINUED | OUTPATIENT
Start: 2022-01-01 | End: 2022-01-01 | Stop reason: HOSPADM

## 2022-01-01 RX ORDER — LORAZEPAM 2 MG/ML
2 INJECTION INTRAMUSCULAR
Status: CANCELLED | OUTPATIENT
Start: 2022-01-01 | End: 2022-01-01

## 2022-01-01 RX ORDER — LORAZEPAM 2 MG/ML
0.5 CONCENTRATE ORAL
Status: CANCELLED | OUTPATIENT
Start: 2022-01-01 | End: 2022-01-01

## 2022-01-01 RX ORDER — ACETAMINOPHEN 650 MG/1
650 SUPPOSITORY RECTAL EVERY 4 HOURS PRN
Status: CANCELLED | OUTPATIENT
Start: 2022-01-01

## 2022-01-01 RX ORDER — MORPHINE SULFATE 10 MG/ML
6 INJECTION INTRAMUSCULAR; INTRAVENOUS; SUBCUTANEOUS
Status: CANCELLED | OUTPATIENT
Start: 2022-01-01 | End: 2022-01-01

## 2022-01-01 RX ADMIN — OLANZAPINE 2.5 MG: 2.5 TABLET ORAL at 11:45

## 2022-01-01 RX ADMIN — CEFAZOLIN SODIUM 2 G: 2 INJECTION, SOLUTION INTRAVENOUS at 13:03

## 2022-01-01 RX ADMIN — Medication 10 ML: at 22:08

## 2022-01-01 RX ADMIN — Medication 10 ML: at 08:45

## 2022-01-01 RX ADMIN — Medication 10 ML: at 20:10

## 2022-01-01 RX ADMIN — LORAZEPAM 1 MG: 2 INJECTION INTRAMUSCULAR; INTRAVENOUS at 17:06

## 2022-01-01 RX ADMIN — LORAZEPAM 2 MG: 2 INJECTION INTRAMUSCULAR; INTRAVENOUS at 05:15

## 2022-01-01 RX ADMIN — LORAZEPAM 1 MG: 2 INJECTION INTRAMUSCULAR; INTRAVENOUS at 13:18

## 2022-01-01 RX ADMIN — LORAZEPAM 2 MG: 2 INJECTION INTRAMUSCULAR; INTRAVENOUS at 17:01

## 2022-01-01 RX ADMIN — POTASSIUM CHLORIDE AND SODIUM CHLORIDE 100 ML/HR: 900; 150 INJECTION, SOLUTION INTRAVENOUS at 15:19

## 2022-01-01 RX ADMIN — MORPHINE SULFATE 4 MG: 4 INJECTION, SOLUTION INTRAMUSCULAR; INTRAVENOUS at 20:34

## 2022-01-01 RX ADMIN — METOPROLOL TARTRATE 2.5 MG: 1 INJECTION, SOLUTION INTRAVENOUS at 03:40

## 2022-01-01 RX ADMIN — SODIUM CHLORIDE 100 ML/HR: 9 INJECTION, SOLUTION INTRAVENOUS at 02:47

## 2022-01-01 RX ADMIN — GLYCOPYRROLATE 0.4 MG: 0.2 INJECTION INTRAMUSCULAR; INTRAVENOUS at 05:15

## 2022-01-01 RX ADMIN — OLANZAPINE 2.5 MG: 2.5 TABLET ORAL at 22:04

## 2022-01-01 RX ADMIN — MORPHINE SULFATE 4 MG: 4 INJECTION, SOLUTION INTRAMUSCULAR; INTRAVENOUS at 04:33

## 2022-01-01 RX ADMIN — LORAZEPAM 1 MG: 2 INJECTION INTRAMUSCULAR; INTRAVENOUS at 16:02

## 2022-01-01 RX ADMIN — PROPRANOLOL HYDROCHLORIDE 80 MG: 80 CAPSULE, EXTENDED RELEASE ORAL at 08:07

## 2022-01-01 RX ADMIN — LEVOTHYROXINE SODIUM 112 MCG: 0.11 TABLET ORAL at 09:15

## 2022-01-01 RX ADMIN — MORPHINE SULFATE 4 MG: 4 INJECTION, SOLUTION INTRAMUSCULAR; INTRAVENOUS at 21:04

## 2022-01-01 RX ADMIN — LEVOTHYROXINE SODIUM 112 MCG: 0.11 TABLET ORAL at 06:01

## 2022-01-01 RX ADMIN — CEFTRIAXONE 1 G: 1 INJECTION, POWDER, FOR SOLUTION INTRAMUSCULAR; INTRAVENOUS at 22:58

## 2022-01-01 RX ADMIN — LORAZEPAM 2 MG: 2 INJECTION INTRAMUSCULAR; INTRAVENOUS at 00:34

## 2022-01-01 RX ADMIN — LORAZEPAM 2 MG: 2 INJECTION INTRAMUSCULAR; INTRAVENOUS at 10:11

## 2022-01-01 RX ADMIN — HYDROMORPHONE HYDROCHLORIDE 1 MG: 1 INJECTION, SOLUTION INTRAMUSCULAR; INTRAVENOUS; SUBCUTANEOUS at 00:34

## 2022-01-01 RX ADMIN — MORPHINE SULFATE 4 MG: 4 INJECTION, SOLUTION INTRAMUSCULAR; INTRAVENOUS at 20:42

## 2022-01-01 RX ADMIN — FAMOTIDINE 20 MG: 20 TABLET ORAL at 09:26

## 2022-01-01 RX ADMIN — MORPHINE SULFATE 1 MG: 2 INJECTION, SOLUTION INTRAMUSCULAR; INTRAVENOUS at 03:02

## 2022-01-01 RX ADMIN — MORPHINE SULFATE 4 MG: 4 INJECTION, SOLUTION INTRAMUSCULAR; INTRAVENOUS at 00:29

## 2022-01-01 RX ADMIN — LORAZEPAM 1 MG: 2 CONCENTRATE ORAL at 16:05

## 2022-01-01 RX ADMIN — GLYCOPYRROLATE 0.2 MG: 0.2 INJECTION INTRAMUSCULAR; INTRAVENOUS at 13:18

## 2022-01-01 RX ADMIN — ATORVASTATIN CALCIUM 40 MG: 20 TABLET, FILM COATED ORAL at 09:02

## 2022-01-01 RX ADMIN — MORPHINE SULFATE 2 MG: 2 INJECTION, SOLUTION INTRAMUSCULAR; INTRAVENOUS at 05:06

## 2022-01-01 RX ADMIN — POTASSIUM CHLORIDE 10 MEQ: 7.46 INJECTION, SOLUTION INTRAVENOUS at 16:16

## 2022-01-01 RX ADMIN — FELODIPINE 10 MG: 5 TABLET, FILM COATED, EXTENDED RELEASE ORAL at 11:45

## 2022-01-01 RX ADMIN — FAMOTIDINE 20 MG: 20 TABLET ORAL at 16:25

## 2022-01-01 RX ADMIN — HYDROMORPHONE HYDROCHLORIDE 0.5 MG: 1 INJECTION, SOLUTION INTRAMUSCULAR; INTRAVENOUS; SUBCUTANEOUS at 00:39

## 2022-01-01 RX ADMIN — CEFTRIAXONE 1 G: 1 INJECTION, POWDER, FOR SOLUTION INTRAMUSCULAR; INTRAVENOUS at 10:54

## 2022-01-01 RX ADMIN — CEFTRIAXONE 1 G: 1 INJECTION, POWDER, FOR SOLUTION INTRAMUSCULAR; INTRAVENOUS at 20:10

## 2022-01-01 RX ADMIN — ATORVASTATIN CALCIUM 20 MG: 20 TABLET, FILM COATED ORAL at 08:53

## 2022-01-01 RX ADMIN — HYDROMORPHONE HYDROCHLORIDE 1 MG: 1 INJECTION, SOLUTION INTRAMUSCULAR; INTRAVENOUS; SUBCUTANEOUS at 05:52

## 2022-01-01 RX ADMIN — LORAZEPAM 2 MG: 2 INJECTION INTRAMUSCULAR; INTRAVENOUS at 05:52

## 2022-01-01 RX ADMIN — ENALAPRILAT 0.62 MG: 1.25 INJECTION INTRAVENOUS at 11:35

## 2022-01-01 RX ADMIN — MORPHINE SULFATE 4 MG: 4 INJECTION, SOLUTION INTRAMUSCULAR; INTRAVENOUS at 08:50

## 2022-01-01 RX ADMIN — LEVOTHYROXINE SODIUM 100 MCG: 0.1 TABLET ORAL at 11:45

## 2022-01-01 RX ADMIN — ONDANSETRON 4 MG: 2 INJECTION INTRAMUSCULAR; INTRAVENOUS at 20:08

## 2022-01-01 RX ADMIN — MORPHINE SULFATE 4 MG: 4 INJECTION, SOLUTION INTRAMUSCULAR; INTRAVENOUS at 17:06

## 2022-01-01 RX ADMIN — SODIUM CHLORIDE, POTASSIUM CHLORIDE, SODIUM LACTATE AND CALCIUM CHLORIDE 1000 ML: 600; 310; 30; 20 INJECTION, SOLUTION INTRAVENOUS at 16:57

## 2022-01-01 RX ADMIN — Medication 10 ML: at 08:59

## 2022-01-01 RX ADMIN — TRAMADOL HYDROCHLORIDE 50 MG: 50 TABLET, COATED ORAL at 10:04

## 2022-01-01 RX ADMIN — POTASSIUM CHLORIDE AND SODIUM CHLORIDE 75 ML/HR: 900; 150 INJECTION, SOLUTION INTRAVENOUS at 01:42

## 2022-01-01 RX ADMIN — LORAZEPAM 1 MG: 2 INJECTION INTRAMUSCULAR; INTRAVENOUS at 08:50

## 2022-01-01 RX ADMIN — CEFAZOLIN SODIUM 2 G: 2 INJECTION, SOLUTION INTRAVENOUS at 05:55

## 2022-01-01 RX ADMIN — MORPHINE SULFATE 5 MG: 20 SOLUTION ORAL at 13:44

## 2022-01-01 RX ADMIN — FELODIPINE 10 MG: 5 TABLET, FILM COATED, EXTENDED RELEASE ORAL at 08:44

## 2022-01-01 RX ADMIN — LORAZEPAM 1 MG: 2 INJECTION INTRAMUSCULAR; INTRAVENOUS at 16:57

## 2022-01-01 RX ADMIN — HYDROMORPHONE HYDROCHLORIDE 1 MG: 1 INJECTION, SOLUTION INTRAMUSCULAR; INTRAVENOUS; SUBCUTANEOUS at 09:20

## 2022-01-01 RX ADMIN — PROPRANOLOL HYDROCHLORIDE 80 MG: 80 CAPSULE, EXTENDED RELEASE ORAL at 09:14

## 2022-01-01 RX ADMIN — MAGNESIUM SULFATE HEPTAHYDRATE 2 G: 40 INJECTION, SOLUTION INTRAVENOUS at 10:26

## 2022-01-01 RX ADMIN — MORPHINE SULFATE 4 MG: 4 INJECTION, SOLUTION INTRAMUSCULAR; INTRAVENOUS at 20:47

## 2022-01-01 RX ADMIN — MORPHINE SULFATE 1 MG: 2 INJECTION, SOLUTION INTRAMUSCULAR; INTRAVENOUS at 09:47

## 2022-01-01 RX ADMIN — Medication 10 ML: at 09:38

## 2022-01-01 RX ADMIN — ATORVASTATIN CALCIUM 40 MG: 20 TABLET, FILM COATED ORAL at 09:38

## 2022-01-01 RX ADMIN — GLYCOPYRROLATE 0.4 MG: 0.2 INJECTION INTRAMUSCULAR; INTRAVENOUS at 10:11

## 2022-01-01 RX ADMIN — GLYCOPYRROLATE 0.4 MG: 0.2 INJECTION INTRAMUSCULAR; INTRAVENOUS at 12:43

## 2022-01-01 RX ADMIN — MORPHINE SULFATE 4 MG: 4 INJECTION, SOLUTION INTRAMUSCULAR; INTRAVENOUS at 00:46

## 2022-01-01 RX ADMIN — FELODIPINE 10 MG: 5 TABLET, FILM COATED, EXTENDED RELEASE ORAL at 08:08

## 2022-01-01 RX ADMIN — MORPHINE SULFATE 1 MG: 2 INJECTION, SOLUTION INTRAMUSCULAR; INTRAVENOUS at 22:17

## 2022-01-01 RX ADMIN — GLYCOPYRROLATE 0.2 MG: 0.2 INJECTION INTRAMUSCULAR; INTRAVENOUS at 20:47

## 2022-01-01 RX ADMIN — LEVOTHYROXINE SODIUM 100 MCG: 0.1 TABLET ORAL at 09:27

## 2022-01-01 RX ADMIN — MORPHINE SULFATE 4 MG: 4 INJECTION, SOLUTION INTRAMUSCULAR; INTRAVENOUS at 00:39

## 2022-01-01 RX ADMIN — HYDROMORPHONE HYDROCHLORIDE 1 MG: 1 INJECTION, SOLUTION INTRAMUSCULAR; INTRAVENOUS; SUBCUTANEOUS at 12:43

## 2022-01-01 RX ADMIN — GLYCOPYRROLATE 0.2 MG: 0.2 INJECTION INTRAMUSCULAR; INTRAVENOUS at 20:59

## 2022-01-01 RX ADMIN — SERTRALINE 100 MG: 100 TABLET, FILM COATED ORAL at 09:02

## 2022-01-01 RX ADMIN — SERTRALINE 100 MG: 100 TABLET, FILM COATED ORAL at 08:45

## 2022-01-01 RX ADMIN — GLYCOPYRROLATE 0.4 MG: 0.2 INJECTION INTRAMUSCULAR; INTRAVENOUS at 17:01

## 2022-01-01 RX ADMIN — LORAZEPAM 2 MG: 2 INJECTION INTRAMUSCULAR; INTRAVENOUS at 09:20

## 2022-01-01 RX ADMIN — METOPROLOL TARTRATE 2.5 MG: 1 INJECTION, SOLUTION INTRAVENOUS at 22:04

## 2022-01-01 RX ADMIN — SODIUM CHLORIDE, SODIUM LACTATE, POTASSIUM CHLORIDE, CALCIUM CHLORIDE AND DEXTROSE MONOHYDRATE 50 ML/HR: 5; 600; 310; 30; 20 INJECTION, SOLUTION INTRAVENOUS at 06:38

## 2022-01-01 RX ADMIN — MORPHINE SULFATE 4 MG: 4 INJECTION, SOLUTION INTRAMUSCULAR; INTRAVENOUS at 08:27

## 2022-01-01 RX ADMIN — SERTRALINE 100 MG: 100 TABLET, FILM COATED ORAL at 09:25

## 2022-01-01 RX ADMIN — LORAZEPAM 2 MG: 2 INJECTION INTRAMUSCULAR; INTRAVENOUS at 14:02

## 2022-01-01 RX ADMIN — ATORVASTATIN CALCIUM 40 MG: 20 TABLET, FILM COATED ORAL at 08:44

## 2022-01-01 RX ADMIN — CEFTRIAXONE 1 G: 1 INJECTION, POWDER, FOR SOLUTION INTRAMUSCULAR; INTRAVENOUS at 20:03

## 2022-01-01 RX ADMIN — LORAZEPAM 2 MG: 2 INJECTION INTRAMUSCULAR; INTRAVENOUS at 21:12

## 2022-01-01 RX ADMIN — MORPHINE SULFATE 4 MG: 4 INJECTION, SOLUTION INTRAMUSCULAR; INTRAVENOUS at 16:57

## 2022-01-01 RX ADMIN — FAMOTIDINE 20 MG: 20 TABLET ORAL at 06:14

## 2022-01-01 RX ADMIN — ACETAMINOPHEN 1000 MG: 500 TABLET ORAL at 09:07

## 2022-01-01 RX ADMIN — SODIUM CHLORIDE 500 ML: 9 INJECTION, SOLUTION INTRAVENOUS at 17:42

## 2022-01-01 RX ADMIN — LORAZEPAM 0.5 MG: 2 INJECTION INTRAMUSCULAR; INTRAVENOUS at 05:44

## 2022-01-01 RX ADMIN — MORPHINE SULFATE 1 MG: 2 INJECTION, SOLUTION INTRAMUSCULAR; INTRAVENOUS at 04:59

## 2022-01-01 RX ADMIN — LORAZEPAM 1 MG: 2 INJECTION INTRAMUSCULAR; INTRAVENOUS at 04:50

## 2022-01-01 RX ADMIN — Medication 10 ML: at 08:20

## 2022-01-01 RX ADMIN — Medication 10 ML: at 09:05

## 2022-01-01 RX ADMIN — MORPHINE SULFATE 4 MG: 4 INJECTION, SOLUTION INTRAMUSCULAR; INTRAVENOUS at 00:45

## 2022-01-01 RX ADMIN — METOPROLOL TARTRATE 2.5 MG: 1 INJECTION, SOLUTION INTRAVENOUS at 15:19

## 2022-01-01 RX ADMIN — POTASSIUM CHLORIDE 10 MEQ: 7.46 INJECTION, SOLUTION INTRAVENOUS at 13:13

## 2022-01-01 RX ADMIN — ACETAMINOPHEN 650 MG: 650 SUPPOSITORY RECTAL at 01:06

## 2022-01-01 RX ADMIN — POTASSIUM CHLORIDE 10 MEQ: 7.46 INJECTION, SOLUTION INTRAVENOUS at 13:07

## 2022-01-01 RX ADMIN — LORAZEPAM 2 MG: 2 INJECTION INTRAMUSCULAR; INTRAVENOUS at 12:43

## 2022-01-01 RX ADMIN — KETOROLAC TROMETHAMINE 10 MG: 15 INJECTION, SOLUTION INTRAMUSCULAR; INTRAVENOUS at 18:05

## 2022-01-01 RX ADMIN — LORAZEPAM 1 MG: 2 CONCENTRATE ORAL at 11:44

## 2022-01-01 RX ADMIN — CEFTRIAXONE 1 G: 1 INJECTION, POWDER, FOR SOLUTION INTRAMUSCULAR; INTRAVENOUS at 22:07

## 2022-01-01 RX ADMIN — MORPHINE SULFATE 4 MG: 4 INJECTION, SOLUTION INTRAMUSCULAR; INTRAVENOUS at 09:14

## 2022-01-01 RX ADMIN — SERTRALINE 100 MG: 100 TABLET, FILM COATED ORAL at 09:38

## 2022-01-01 RX ADMIN — LORAZEPAM 1 MG: 2 INJECTION INTRAMUSCULAR; INTRAVENOUS at 12:53

## 2022-01-01 RX ADMIN — POTASSIUM CHLORIDE 10 MEQ: 7.46 INJECTION, SOLUTION INTRAVENOUS at 14:49

## 2022-01-01 RX ADMIN — POTASSIUM CHLORIDE 10 MEQ: 7.46 INJECTION, SOLUTION INTRAVENOUS at 11:50

## 2022-01-01 RX ADMIN — MORPHINE SULFATE 4 MG: 4 INJECTION, SOLUTION INTRAMUSCULAR; INTRAVENOUS at 08:11

## 2022-01-01 RX ADMIN — LEVOTHYROXINE SODIUM 112 MCG: 0.11 TABLET ORAL at 09:59

## 2022-01-01 RX ADMIN — Medication 10 ML: at 08:26

## 2022-01-01 RX ADMIN — PROPRANOLOL HYDROCHLORIDE 80 MG: 80 CAPSULE, EXTENDED RELEASE ORAL at 09:38

## 2022-01-01 RX ADMIN — LEVOTHYROXINE SODIUM 112 MCG: 0.11 TABLET ORAL at 05:38

## 2022-01-01 RX ADMIN — MORPHINE SULFATE 4 MG: 4 INJECTION, SOLUTION INTRAMUSCULAR; INTRAVENOUS at 05:04

## 2022-01-01 RX ADMIN — MORPHINE SULFATE 4 MG: 4 INJECTION, SOLUTION INTRAMUSCULAR; INTRAVENOUS at 13:18

## 2022-01-01 RX ADMIN — CEFAZOLIN SODIUM 2 G: 2 INJECTION, SOLUTION INTRAVENOUS at 22:05

## 2022-01-01 RX ADMIN — GLYCOPYRROLATE 0.4 MG: 0.2 INJECTION INTRAMUSCULAR; INTRAVENOUS at 09:19

## 2022-01-01 RX ADMIN — Medication 10 ML: at 10:02

## 2022-01-01 RX ADMIN — POTASSIUM CHLORIDE 10 MEQ: 7.46 INJECTION, SOLUTION INTRAVENOUS at 10:26

## 2022-01-01 RX ADMIN — MORPHINE SULFATE 4 MG: 4 INJECTION, SOLUTION INTRAMUSCULAR; INTRAVENOUS at 04:50

## 2022-01-01 RX ADMIN — ATORVASTATIN CALCIUM 40 MG: 20 TABLET, FILM COATED ORAL at 11:45

## 2022-01-01 RX ADMIN — POTASSIUM CHLORIDE 10 MEQ: 7.46 INJECTION, SOLUTION INTRAVENOUS at 12:01

## 2022-01-01 RX ADMIN — LORAZEPAM 1 MG: 2 INJECTION INTRAMUSCULAR; INTRAVENOUS at 08:57

## 2022-01-01 RX ADMIN — METOPROLOL TARTRATE 2.5 MG: 1 INJECTION, SOLUTION INTRAVENOUS at 08:44

## 2022-01-01 RX ADMIN — HYDROMORPHONE HYDROCHLORIDE 1 MG: 1 INJECTION, SOLUTION INTRAMUSCULAR; INTRAVENOUS; SUBCUTANEOUS at 10:10

## 2022-01-01 RX ADMIN — MORPHINE SULFATE 4 MG: 4 INJECTION, SOLUTION INTRAMUSCULAR; INTRAVENOUS at 08:57

## 2022-01-01 RX ADMIN — Medication 10 ML: at 22:07

## 2022-01-01 RX ADMIN — SODIUM CHLORIDE 500 ML: 9 INJECTION, SOLUTION INTRAVENOUS at 09:40

## 2022-01-01 RX ADMIN — LEVOTHYROXINE SODIUM 112 MCG: 0.11 TABLET ORAL at 06:48

## 2022-01-01 RX ADMIN — Medication 10 ML: at 20:03

## 2022-01-01 RX ADMIN — MORPHINE SULFATE 4 MG: 4 INJECTION, SOLUTION INTRAMUSCULAR; INTRAVENOUS at 16:05

## 2022-01-01 RX ADMIN — CEFTRIAXONE 1 G: 1 INJECTION, POWDER, FOR SOLUTION INTRAMUSCULAR; INTRAVENOUS at 19:16

## 2022-01-01 RX ADMIN — SODIUM CHLORIDE 125 ML/HR: 9 INJECTION, SOLUTION INTRAVENOUS at 10:21

## 2022-01-01 RX ADMIN — PROPRANOLOL HYDROCHLORIDE 80 MG: 80 CAPSULE, EXTENDED RELEASE ORAL at 14:33

## 2022-01-01 RX ADMIN — CEFAZOLIN SODIUM 2 G: 2 INJECTION, SOLUTION INTRAVENOUS at 22:08

## 2022-01-01 RX ADMIN — LORAZEPAM 0.5 MG: 2 CONCENTRATE ORAL at 14:34

## 2022-01-01 RX ADMIN — Medication 10 ML: at 20:47

## 2022-01-01 RX ADMIN — SODIUM CHLORIDE 100 ML/HR: 9 INJECTION, SOLUTION INTRAVENOUS at 16:16

## 2022-01-01 RX ADMIN — CLONIDINE HYDROCHLORIDE 0.1 MG: 0.1 TABLET ORAL at 05:38

## 2022-01-01 RX ADMIN — SERTRALINE 100 MG: 100 TABLET, FILM COATED ORAL at 09:59

## 2022-01-01 RX ADMIN — FELODIPINE 10 MG: 5 TABLET, FILM COATED, EXTENDED RELEASE ORAL at 09:26

## 2022-01-01 RX ADMIN — MORPHINE SULFATE 1 MG: 2 INJECTION, SOLUTION INTRAMUSCULAR; INTRAVENOUS at 14:16

## 2022-01-01 RX ADMIN — GLYCOPYRROLATE 0.2 MG: 0.2 INJECTION INTRAMUSCULAR; INTRAVENOUS at 16:05

## 2022-01-01 RX ADMIN — MORPHINE SULFATE 4 MG: 4 INJECTION, SOLUTION INTRAMUSCULAR; INTRAVENOUS at 13:20

## 2022-01-01 RX ADMIN — POTASSIUM CHLORIDE 40 MEQ: 1.5 POWDER, FOR SOLUTION ORAL at 12:52

## 2022-01-01 RX ADMIN — LORAZEPAM 1 MG: 2 INJECTION INTRAMUSCULAR; INTRAVENOUS at 21:04

## 2022-01-01 RX ADMIN — FELODIPINE 10 MG: 5 TABLET, FILM COATED, EXTENDED RELEASE ORAL at 09:14

## 2022-01-01 RX ADMIN — LORAZEPAM 1 MG: 2 CONCENTRATE ORAL at 07:30

## 2022-01-01 RX ADMIN — OLANZAPINE 2.5 MG: 2.5 TABLET ORAL at 09:02

## 2022-01-01 RX ADMIN — POTASSIUM CHLORIDE AND SODIUM CHLORIDE 100 ML/HR: 900; 150 INJECTION, SOLUTION INTRAVENOUS at 12:39

## 2022-01-01 RX ADMIN — METOPROLOL TARTRATE 2.5 MG: 1 INJECTION, SOLUTION INTRAVENOUS at 22:06

## 2022-01-01 RX ADMIN — Medication 10 ML: at 20:25

## 2022-01-01 RX ADMIN — LORAZEPAM 2 MG: 2 INJECTION INTRAMUSCULAR; INTRAVENOUS at 00:46

## 2022-01-01 RX ADMIN — METOPROLOL TARTRATE 25 MG: 25 TABLET, FILM COATED ORAL at 00:01

## 2022-01-01 RX ADMIN — MORPHINE SULFATE 4 MG: 4 INJECTION, SOLUTION INTRAMUSCULAR; INTRAVENOUS at 12:53

## 2022-01-01 RX ADMIN — FELODIPINE 10 MG: 5 TABLET, FILM COATED, EXTENDED RELEASE ORAL at 09:59

## 2022-01-01 RX ADMIN — GLYCOPYRROLATE 0.4 MG: 0.2 INJECTION INTRAMUSCULAR; INTRAVENOUS at 14:02

## 2022-01-01 RX ADMIN — HYDROMORPHONE HYDROCHLORIDE 1 MG: 1 INJECTION, SOLUTION INTRAMUSCULAR; INTRAVENOUS; SUBCUTANEOUS at 21:12

## 2022-01-01 RX ADMIN — CEFAZOLIN SODIUM 2 G: 2 INJECTION, SOLUTION INTRAVENOUS at 06:01

## 2022-01-01 RX ADMIN — MORPHINE SULFATE 4 MG: 4 INJECTION, SOLUTION INTRAMUSCULAR; INTRAVENOUS at 22:42

## 2022-01-01 RX ADMIN — SODIUM CHLORIDE 100 ML/HR: 9 INJECTION, SOLUTION INTRAVENOUS at 23:24

## 2022-01-01 RX ADMIN — LORAZEPAM 1 MG: 2 INJECTION INTRAMUSCULAR; INTRAVENOUS at 20:59

## 2022-01-01 RX ADMIN — MORPHINE SULFATE 4 MG: 4 INJECTION, SOLUTION INTRAMUSCULAR; INTRAVENOUS at 12:48

## 2022-01-01 RX ADMIN — MORPHINE SULFATE 4 MG: 4 INJECTION, SOLUTION INTRAMUSCULAR; INTRAVENOUS at 20:59

## 2022-01-01 RX ADMIN — MORPHINE SULFATE 1 MG: 2 INJECTION, SOLUTION INTRAMUSCULAR; INTRAVENOUS at 22:24

## 2022-01-01 RX ADMIN — SODIUM CHLORIDE 100 ML/HR: 9 INJECTION, SOLUTION INTRAVENOUS at 22:07

## 2022-01-01 RX ADMIN — ATORVASTATIN CALCIUM 40 MG: 20 TABLET, FILM COATED ORAL at 09:59

## 2022-01-01 RX ADMIN — POTASSIUM CHLORIDE 40 MEQ: 1.5 POWDER, FOR SOLUTION ORAL at 15:22

## 2022-01-01 RX ADMIN — LORAZEPAM 1 MG: 2 INJECTION INTRAMUSCULAR; INTRAVENOUS at 00:29

## 2022-01-01 RX ADMIN — OLANZAPINE 2.5 MG: 2.5 TABLET ORAL at 17:26

## 2022-01-01 RX ADMIN — MORPHINE SULFATE 4 MG: 4 INJECTION, SOLUTION INTRAMUSCULAR; INTRAVENOUS at 16:01

## 2022-01-01 RX ADMIN — LEVOTHYROXINE SODIUM 125 MCG: 0.12 TABLET ORAL at 05:37

## 2022-01-01 RX ADMIN — SERTRALINE 100 MG: 100 TABLET, FILM COATED ORAL at 09:27

## 2022-01-01 RX ADMIN — PROPRANOLOL HYDROCHLORIDE 80 MG: 80 CAPSULE, EXTENDED RELEASE ORAL at 11:45

## 2022-01-01 RX ADMIN — LORAZEPAM 1 MG: 2 INJECTION INTRAMUSCULAR; INTRAVENOUS at 00:39

## 2022-01-01 RX ADMIN — MORPHINE SULFATE 4 MG: 4 INJECTION, SOLUTION INTRAMUSCULAR; INTRAVENOUS at 17:04

## 2022-01-01 RX ADMIN — ATORVASTATIN CALCIUM 40 MG: 20 TABLET, FILM COATED ORAL at 09:25

## 2022-01-01 RX ADMIN — GLYCOPYRROLATE 0.4 MG: 0.2 INJECTION INTRAMUSCULAR; INTRAVENOUS at 00:39

## 2022-01-01 RX ADMIN — GLYCOPYRROLATE 0.2 MG: 0.2 INJECTION INTRAMUSCULAR; INTRAVENOUS at 17:06

## 2022-01-01 RX ADMIN — FELODIPINE 10 MG: 5 TABLET, FILM COATED, EXTENDED RELEASE ORAL at 09:38

## 2022-01-01 RX ADMIN — MORPHINE SULFATE 4 MG: 4 INJECTION, SOLUTION INTRAMUSCULAR; INTRAVENOUS at 07:30

## 2022-01-01 RX ADMIN — SODIUM CHLORIDE 100 ML/HR: 9 INJECTION, SOLUTION INTRAVENOUS at 11:58

## 2022-01-01 RX ADMIN — Medication 10 ML: at 21:57

## 2022-01-01 RX ADMIN — POTASSIUM CHLORIDE 10 MEQ: 7.46 INJECTION, SOLUTION INTRAVENOUS at 14:11

## 2022-01-01 RX ADMIN — ATORVASTATIN CALCIUM 40 MG: 20 TABLET, FILM COATED ORAL at 09:26

## 2022-01-01 RX ADMIN — LORAZEPAM 1 MG: 2 INJECTION INTRAMUSCULAR; INTRAVENOUS at 20:47

## 2022-01-01 RX ADMIN — ACETAMINOPHEN 650 MG: 325 TABLET ORAL at 16:13

## 2022-01-01 RX ADMIN — MORPHINE SULFATE 4 MG: 4 INJECTION, SOLUTION INTRAMUSCULAR; INTRAVENOUS at 11:44

## 2022-01-01 RX ADMIN — LORAZEPAM 0.5 MG: 2 INJECTION INTRAMUSCULAR; INTRAVENOUS at 05:04

## 2022-01-01 RX ADMIN — GLYCOPYRROLATE 0.4 MG: 0.2 INJECTION INTRAMUSCULAR; INTRAVENOUS at 21:12

## 2022-01-01 RX ADMIN — OLANZAPINE 5 MG: 10 INJECTION, POWDER, FOR SOLUTION INTRAMUSCULAR at 21:44

## 2022-01-01 RX ADMIN — MORPHINE SULFATE 2 MG: 2 INJECTION, SOLUTION INTRAMUSCULAR; INTRAVENOUS at 20:08

## 2022-01-01 RX ADMIN — PROPRANOLOL HYDROCHLORIDE 80 MG: 80 CAPSULE, EXTENDED RELEASE ORAL at 09:02

## 2022-01-01 RX ADMIN — HYDROMORPHONE HYDROCHLORIDE 1 MG: 1 INJECTION, SOLUTION INTRAMUSCULAR; INTRAVENOUS; SUBCUTANEOUS at 05:15

## 2022-01-01 RX ADMIN — CEFAZOLIN SODIUM 2 G: 2 INJECTION, SOLUTION INTRAVENOUS at 13:21

## 2022-01-01 RX ADMIN — SODIUM CHLORIDE 100 ML/HR: 9 INJECTION, SOLUTION INTRAVENOUS at 11:38

## 2022-01-01 RX ADMIN — OLANZAPINE 2.5 MG: 2.5 TABLET ORAL at 08:45

## 2022-01-01 RX ADMIN — GLYCOPYRROLATE 0.4 MG: 0.2 INJECTION INTRAMUSCULAR; INTRAVENOUS at 00:34

## 2022-01-01 RX ADMIN — METOPROLOL TARTRATE 2.5 MG: 1 INJECTION, SOLUTION INTRAVENOUS at 03:16

## 2022-01-01 RX ADMIN — FAMOTIDINE 20 MG: 20 TABLET ORAL at 11:46

## 2022-01-01 RX ADMIN — SERTRALINE 100 MG: 100 TABLET, FILM COATED ORAL at 11:45

## 2022-01-01 RX ADMIN — LORAZEPAM 0.5 MG: 2 INJECTION INTRAMUSCULAR; INTRAVENOUS at 20:42

## 2022-01-01 RX ADMIN — MORPHINE SULFATE 1 MG: 2 INJECTION, SOLUTION INTRAMUSCULAR; INTRAVENOUS at 10:26

## 2022-01-01 RX ADMIN — Medication 10 ML: at 11:46

## 2022-01-01 RX ADMIN — HYDROMORPHONE HYDROCHLORIDE 1 MG: 1 INJECTION, SOLUTION INTRAMUSCULAR; INTRAVENOUS; SUBCUTANEOUS at 14:02

## 2022-01-01 RX ADMIN — LABETALOL HYDROCHLORIDE 20 MG: 5 INJECTION, SOLUTION INTRAVENOUS at 10:06

## 2022-01-01 RX ADMIN — CEFTRIAXONE 1 G: 1 INJECTION, POWDER, FOR SOLUTION INTRAMUSCULAR; INTRAVENOUS at 21:13

## 2022-01-01 RX ADMIN — GLYCOPYRROLATE 0.4 MG: 0.2 INJECTION INTRAMUSCULAR; INTRAVENOUS at 05:52

## 2022-01-01 RX ADMIN — LEVOTHYROXINE SODIUM 100 MCG: 0.1 TABLET ORAL at 06:14

## 2022-01-01 RX ADMIN — HYDROMORPHONE HYDROCHLORIDE 1 MG: 1 INJECTION, SOLUTION INTRAMUSCULAR; INTRAVENOUS; SUBCUTANEOUS at 17:01

## 2022-01-01 RX ADMIN — Medication 10 ML: at 09:27

## 2022-01-01 RX ADMIN — Medication 10 ML: at 09:25

## 2022-01-01 RX ADMIN — Medication 10 ML: at 22:05

## 2022-01-01 RX ADMIN — PROPRANOLOL HYDROCHLORIDE 120 MG: 60 CAPSULE, EXTENDED RELEASE ORAL at 09:59

## 2022-01-01 RX ADMIN — LEVOTHYROXINE SODIUM 112 MCG: 0.11 TABLET ORAL at 05:56

## 2022-03-26 PROBLEM — D49.6 NEOPLASM OF BRAIN: Status: RESOLVED | Noted: 2020-10-14 | Resolved: 2022-01-01

## 2022-03-26 PROBLEM — G93.41 METABOLIC ENCEPHALOPATHY: Status: ACTIVE | Noted: 2022-01-01

## 2022-03-26 PROBLEM — Z74.09 IMMOBILITY: Status: ACTIVE | Noted: 2022-01-01

## 2022-03-26 PROBLEM — D49.6 NEOPLASM OF BRAIN (HCC): Status: ACTIVE | Noted: 2020-10-14

## 2022-03-26 PROBLEM — R53.1 GENERALIZED WEAKNESS: Status: ACTIVE | Noted: 2022-01-01

## 2022-03-26 PROBLEM — R82.71 BACTERIA IN URINE: Status: ACTIVE | Noted: 2022-01-01

## 2022-04-01 PROBLEM — R93.3 ABNORMAL ESOPHAGRAM: Status: ACTIVE | Noted: 2022-01-01

## 2022-04-01 PROBLEM — R13.10 DYSPHAGIA: Status: ACTIVE | Noted: 2022-01-01

## 2022-04-01 NOTE — PROGRESS NOTES
Gastroenterology   Inpatient Progress Note    Reason for Follow Up: Dysphagia    Subjective     Interval History:   Subjective assessment unable to be obtained due to patient's level of confusion.  APRN spoke with patient's nurse.  Cardiology has been consulted for atypical chest pain, but consult has not yet been performed.  Esophagram 3/31 reveals a fine stricture or Schatzki's ring located just above the GE junction.  Patient tolerating clear liquid diet.    Current Facility-Administered Medications:   •  acetaminophen (TYLENOL) tablet 650 mg, 650 mg, Oral, Q4H PRN **OR** acetaminophen (TYLENOL) 160 MG/5ML solution 650 mg, 650 mg, Oral, Q4H PRN **OR** acetaminophen (TYLENOL) suppository 650 mg, 650 mg, Rectal, Q4H PRN, Jennifer Sanchez APRN  •  atorvastatin (LIPITOR) tablet 40 mg, 40 mg, Oral, Daily, Pelon Hanley MD, 40 mg at 03/30/22 0959  •  ceFAZolin in dextrose (ANCEF) IVPB solution 2 g, 2 g, Intravenous, Q8H, Saleem Shetty MD, 2 g at 04/01/22 1321  •  enalaprilat (VASOTEC) injection 1.25 mg, 1.25 mg, Intravenous, Q6H PRN, Saleem Shetty MD  •  felodipine (PLENDIL) 24 hr tablet 10 mg, 10 mg, Oral, Daily, Pelon Hanley MD, 10 mg at 03/30/22 0959  •  labetalol (NORMODYNE,TRANDATE) injection 20 mg, 20 mg, Intravenous, Q6H PRN, Saleem Shetty MD, 20 mg at 03/31/22 1006  •  levothyroxine (SYNTHROID, LEVOTHROID) tablet 112 mcg, 112 mcg, Oral, Q AM, Demi Winchester MD, 112 mcg at 04/01/22 0601  •  magnesium sulfate 4 gram infusion - Mg less than or equal to 1mg/dL, 4 g, Intravenous, PRN **OR** magnesium sulfate 3 gram infusion (1gm x 3) - Mg 1.1 - 1.5 mg/dL, 1 g, Intravenous, PRN **OR** Magnesium Sulfate 2 gram infusion- Mg 1.6 - 1.9 mg/dL, 2 g, Intravenous, PRN, Saleem Shetty MD  •  metoprolol tartrate (LOPRESSOR) injection 2.5 mg, 2.5 mg, Intravenous, Q6H, Saleem Shetty MD, 2.5 mg at 04/01/22 0316  •  nitroglycerin (NITROSTAT) SL tablet 0.4 mg, 0.4 mg, Sublingual,  Q5 Min PRN, Jennifer Sanchez APRN  •  OLANZapine (zyPREXA) injection 5 mg, 5 mg, Intramuscular, Q8H PRN, Saleem Shetty MD, 5 mg at 03/31/22 2144  •  OLANZapine (zyPREXA) tablet 2.5 mg, 2.5 mg, Oral, BID, Saleem Shetty MD  •  ondansetron (ZOFRAN) injection 4 mg, 4 mg, Intravenous, Q6H PRN, Jennifer Sanchez APRN  •  potassium chloride (K-DUR,KLOR-CON) ER tablet 40 mEq, 40 mEq, Oral, PRN **OR** potassium chloride (KLOR-CON) packet 40 mEq, 40 mEq, Oral, PRN **OR** potassium chloride 10 mEq in 100 mL IVPB, 10 mEq, Intravenous, Q1H PRN, Demi Winchester MD, Last Rate: 100 mL/hr at 03/27/22 1616, 10 mEq at 03/27/22 1616  •  sertraline (ZOLOFT) tablet 100 mg, 100 mg, Oral, Daily, Pelon Hanley MD, 100 mg at 03/30/22 0959  •  [COMPLETED] Insert peripheral IV, , , Once **AND** sodium chloride 0.9 % flush 10 mL, 10 mL, Intravenous, PRN, Ja Adorno MD  •  sodium chloride 0.9 % flush 10 mL, 10 mL, Intravenous, Q12H, Jennifer Sanchez APRN, 10 mL at 04/01/22 0826  •  sodium chloride 0.9 % flush 10 mL, 10 mL, Intravenous, PRN, Jennifer Sanchez APRN  •  sodium chloride 0.9 % with KCl 20 mEq/L infusion, 75 mL/hr, Intravenous, Continuous, Saleem Shetty MD, Last Rate: 75 mL/hr at 04/01/22 1321, 75 mL/hr at 04/01/22 1321  Review of Systems:    Review of systems could not be obtained due to  patient confusion.    Objective     Vital Signs  Temp:  [98.4 °F (36.9 °C)-98.7 °F (37.1 °C)] 98.5 °F (36.9 °C)  Heart Rate:  [50-71] 54  Resp:  [18] 18  BP: (152-196)/() 152/83  Body mass index is 24.27 kg/m².    Intake/Output Summary (Last 24 hours) at 4/1/2022 1824  Last data filed at 4/1/2022 0900  Gross per 24 hour   Intake --   Output 1450 ml   Net -1450 ml     I/O this shift:  In: -   Out: 700 [Urine:700]     Physical Exam:   General: patient awake, alert and cooperative   Eyes: no scleral icterus   Skin: warm and dry, not jaundiced   Abdomen: soft, nontender, nondistended; normal bowel  sounds, no masses palpated, no periumbical lymphadenopathy   Psychiatric: Confused     Results Review:     I reviewed the patient's new clinical results.  I reviewed the patient's new imaging results and agree with the interpretation.  I reviewed the patient's other test results and agree with the interpretation    Results from last 7 days   Lab Units 04/01/22 0456 03/31/22 0456 03/30/22 1957   WBC 10*3/mm3 8.63 9.08 7.76   HEMOGLOBIN g/dL 14.8 13.4 13.7   HEMATOCRIT % 44.8 39.8 40.9   PLATELETS 10*3/mm3 236 214 227     Results from last 7 days   Lab Units 04/01/22 0456 03/31/22 0456 03/30/22 1957 03/27/22 0432 03/26/22  1741   SODIUM mmol/L 141 140 141   < > 139   POTASSIUM mmol/L 3.5 3.3* 3.4*   < > 3.3*   CHLORIDE mmol/L 108* 104 104   < > 101   CO2 mmol/L 18.0* 25.6 25.7   < > 25.1   BUN mg/dL 11 9 9   < > 15   CREATININE mg/dL 0.65 0.64 0.65   < > 0.63   CALCIUM mg/dL 9.3 9.3 9.2   < > 9.1   BILIRUBIN mg/dL  --   --   --   --  0.8   ALK PHOS U/L  --   --   --   --  74   ALT (SGPT) U/L  --   --   --   --  11   AST (SGOT) U/L  --   --   --   --  14   GLUCOSE mg/dL 79 97 97   < > 152*    < > = values in this interval not displayed.     Results from last 7 days   Lab Units 03/27/22 0432   INR  1.10     Lab Results   Lab Value Date/Time    LIPASE 18 03/26/2022 1741       Radiology:  FL Esophagram Complete Single Contrast   Final Result      CT Head Without Contrast   Final Result   Evidence of extensive small vessel chronic ischemic changes   described. No acute intracranial abnormality is identified.       This report was finalized on 3/26/2022 6:38 PM by Dr. Lam Chambers M.D.          CT Cervical Spine Without Contrast   Final Result   Extensive multilevel degenerative disc changes as noted.   There is no evidence of acute fracture.       This report was finalized on 3/26/2022 6:47 PM by Dr. Lam Chambers M.D.          CT Lumbar Spine Without Contrast   Final Result   Extensive multilevel  degenerative disc changes in the lumbar   spine producing dextroscoliosis. Chronic compression fracture of T12. No   acute fractures are identified. Degenerative disc changes and facet   joint arthropathy result in significant central canal stenosis at the   lower 3 lumbar levels.       This report was finalized on 3/26/2022 6:36 PM by Dr. Lam Chambers M.D.              Assessment/Plan     Patient Active Problem List   Diagnosis   • Generalized weakness   • Hypertension   • Hyperlipidemia   • Hypothyroidism   • Impairment of balance   • Immobility   • Bacteria in urine   • Metabolic encephalopathy       Assessment:  1. Atypical chest pain  2. Dysphagia  3. Confusion    These problems are new to me.    Plan:  · Cardiology has been consulted, but patient has not yet been evaluated and cleared for EGD.  · Esophagram demonstrates fine stricture or Schatzki's ring.  EGD with possible esophageal dilation indicated.  · We will keep patient on clear liquids for now, and once cardiology has provided clearance, we will plan to proceed with EGD with dilation.    Addendum:    Patient has been cleared by cardiology to proceed with EGD.  Patient's RN contacted APRN to relay information.  We will plan for EGD tomorrow with possible esophageal dilation for dysphagia and stricture versus Schatzki's ring.  N.p.o. after midnight.    I discussed the patients findings and my recommendations with patient and nursing staff.    JUSTYN Cee APRN  [facility] [address] [address]  Office: [phone]

## 2022-04-01 NOTE — SIGNIFICANT NOTE
04/01/22 1438   OTHER   Discipline physical therapist   Rehab Time/Intention   Session Not Performed other (see comments)  (Pt is very lethargic after xyprexa. Dont appropriate for PT today.)   Recommendation   PT - Next Appointment 04/02/22

## 2022-04-01 NOTE — PROGRESS NOTES
Name: Katie Douglas ADMIT: 3/26/2022   : 1931  PCP: Sandrita Godniez MD    MRN: [id number] LOS: 5 days   AGE/SEX: 89 y.o. female  ROOM: [facility]   Subjective   Chief Complaint   Patient presents with   • Weakness - Generalized      She ended up requiring Zyprexa yesterday evening.  Discussed with nursing and it took a long time for this to take effect but when it eventually did she calmed down and has been drowsy since.  When I saw her she would awaken slightly to physical stimulus but was not able to answer any questions.  She was not having any acute distress.  Discussed with family at bedside.    Objective   Vital Signs  Temp:  [98.4 °F (36.9 °C)-98.9 °F (37.2 °C)] 98.4 °F (36.9 °C)  Heart Rate:  [50-71] 53  Resp:  [18] 18  BP: (153-196)/() 153/86  SpO2:  [93 %-100 %] 93 %  on   ;   Device (Oxygen Therapy): room air  Body mass index is 24.27 kg/m².    Physical Exam  Vitals and nursing note reviewed.   Constitutional:       General: She is not in acute distress.     Appearance: She is not diaphoretic.   HENT:      Head: Normocephalic and atraumatic.   Eyes:      General:         Right eye: No discharge.         Left eye: No discharge.      Conjunctiva/sclera: Conjunctivae normal.   Cardiovascular:      Rate and Rhythm: Regular rhythm. Bradycardia present.      Pulses: Normal pulses.   Pulmonary:      Effort: Pulmonary effort is normal.      Breath sounds: Decreased breath sounds present. No wheezing.   Abdominal:      General: There is no distension.      Palpations: Abdomen is soft.      Tenderness: There is no abdominal tenderness. There is no guarding or rebound.   Musculoskeletal:         General: No swelling or tenderness.      Cervical back: Neck supple. No tenderness.   Skin:     General: Skin is warm and dry.   Neurological:      Mental Status: She is disoriented.   Psychiatric:         Cognition and Memory: Cognition is impaired. Memory is impaired.         Results Review:       I  reviewed the patient's new clinical results.     I reviewed imaging, agree with interpretation.     I reviewed telemetry/EKG results, bradycardia on telemetry      Results from last 7 days   Lab Units 04/01/22 0456 03/31/22 0456 03/30/22 1957 03/29/22 0524   WBC 10*3/mm3 8.63 9.08 7.76 7.54   HEMOGLOBIN g/dL 14.8 13.4 13.7 13.5   PLATELETS 10*3/mm3 236 214 227 194     Results from last 7 days   Lab Units 04/01/22 0456 03/31/22 0456 03/30/22 1957 03/29/22 0524   SODIUM mmol/L 141 140 141 144   POTASSIUM mmol/L 3.5 3.3* 3.4* 3.7   CHLORIDE mmol/L 108* 104 104 105   CO2 mmol/L 18.0* 25.6 25.7 25.1   BUN mg/dL 11 9 9 8   CREATININE mg/dL 0.65 0.64 0.65 0.71   GLUCOSE mg/dL 79 97 97 98   Estimated Creatinine Clearance: 49.1 mL/min (by C-G formula based on SCr of 0.65 mg/dL).  Results from last 7 days   Lab Units 04/01/22 0456 03/31/22 0456 03/30/22 1957 03/29/22 0524 03/28/22  0551 03/27/22 0432 03/26/22  1741   CALCIUM mg/dL 9.3 9.3 9.2 9.2   < > 8.4 9.1   ALBUMIN g/dL  --   --   --   --   --   --  3.90   MAGNESIUM mg/dL 1.9 1.8  --   --   --  1.7  --     < > = values in this interval not displayed.     Results from last 7 days   Lab Units 03/27/22  0432   INR  1.10        atorvastatin, 40 mg, Oral, Daily  ceFAZolin, 2 g, Intravenous, Q8H  felodipine, 10 mg, Oral, Daily  levothyroxine, 112 mcg, Oral, Q AM  metoprolol tartrate, 2.5 mg, Intravenous, Q6H  OLANZapine, 2.5 mg, Oral, BID  sertraline, 100 mg, Oral, Daily  sodium chloride, 10 mL, Intravenous, Q12H      sodium chloride 0.9 % with KCl 20 mEq, 100 mL/hr, Last Rate: 100 mL/hr (04/01/22 1239)    NPO Diet    Assessment/Plan      Active Hospital Problems    Diagnosis  POA   • **Generalized weakness [R53.1]  Yes   • Immobility [Z74.09]  Yes   • Bacteria in urine [R82.71]  Yes   • Metabolic encephalopathy [G93.41]  Yes   • Impairment of balance [R26.89]  Yes   • Benign essential hypertension [I10]  Yes   • Hyperlipidemia [E78.5]  Yes   • Hypothyroidism  [E03.9]  Yes      Resolved Hospital Problems   No resolved problems to display.       · UTI: Klebsiella sensitive to cephalosporin. Continue Cefazolin.  · HTN: Tachycardia resolved.  I have placed hold parameters on IV metoprolol since she is bradycardic now.  Her pressure was elevated last night but it is improving today.  Vasotec is available as needed while she is n.p.o.  · Metabolic Encephalopathy: 2/2 UTI. Possible unmasking of underlying dementia or mci.  A lower dose of scheduled Zyprexa is planned when she is more alert. Psychiatry following.  · Dysphagia/Hx Dilation: Schatzki's ring on esophagram. GI following.  · CP: May have been related to aspiration/choking event especially given the report by SLP.  Planned cardiology consult if she requires endoscopy..  · Hypothyroidism: Synthroid  · HLD: Statin  · PPx: SCD pending potential procedure  · Disposition: SNF v HH/TBD    Saleem Shetty MD  [facility]  04/01/22  12:55 EDT    Dictated portions using Dragon dictation software.    During the entire encounter, I was wearing recommended PPE including face mask and eye protection. Hand sanitization was performed prior to entering room and upon exit.

## 2022-04-01 NOTE — PROGRESS NOTES
The patient is a bed this morning and cannot be aroused for interview.  Staff reports that she continues to have behavioral issues, yelling and cussing at staff and attempting to get out of bed.  I will add scheduled doses of Zyprexa.

## 2022-04-01 NOTE — PLAN OF CARE
Problem: Restraint, Nonbehavioral (Nonviolent)  Goal: Absence of Harm or Injury  3/31/2022 2027 by Priscila Estevez RN  Outcome: Ongoing, Not Progressing  3/31/2022 2025 by Priscila Estevez RN  Outcome: Ongoing, Not Progressing  Intervention: Implement Least Restrictive Safety Strategies  Recent Flowsheet Documentation  Taken 3/31/2022 1800 by Priscila Estevez RN  Medical Device Protection: tubing secured  Less Restrictive Alternative:  • bed alarm in use  • appropriate expression promoted  • calming techniques promoted  • emotional support provided  • family presence promoted  • positive reinforcement provided  • safety enhancements provided  De-Escalation Techniques:  • verbally redirected  • stimulation decreased  • reoriented  • physical activity promoted  • increased round frequency  • family involvement requested  • diversional activity encouraged  • appropriate behavior reinforced  Diversional Activities: television  Taken 3/31/2022 1745 by Priscila Estevez RN  Medical Device Protection: tubing secured  Less Restrictive Alternative:  • bed alarm in use  • appropriate expression promoted  • calming techniques promoted  • emotional support provided  • family presence promoted  • positive reinforcement provided  • safety enhancements provided  De-Escalation Techniques:  • verbally redirected  • stimulation decreased  • reoriented  • increased round frequency  • family involvement requested  • diversional activity encouraged  • appropriate behavior reinforced  Diversional Activities: television  Taken 3/31/2022 1600 by Hutchason, Priscila, RN  Less Restrictive Alternative: (HUGO, esophagram) --  Taken 3/31/2022 1400 by Priscila Estevez RN  Medical Device Protection: tubing secured  Less Restrictive Alternative:  • bed alarm in use  • appropriate expression promoted  • calming techniques promoted  • emotional support provided  • family presence promoted  • positive reinforcement provided  •  safety enhancements provided  De-Escalation Techniques:  • verbally redirected  • stimulation decreased  • reoriented  • family involvement requested  • diversional activity encouraged  • appropriate behavior reinforced  Diversional Activities: television  Taken 3/31/2022 1200 by Priscila Estevez RN  Medical Device Protection: tubing secured  Less Restrictive Alternative:  • bed alarm in use  • appropriate expression promoted  • calming techniques promoted  • emotional support provided  • family presence promoted  • positive reinforcement provided  • safety enhancements provided  De-Escalation Techniques:  • verbally redirected  • stimulation decreased  • reoriented  • increased round frequency  • family involvement requested  • diversional activity encouraged  • appropriate behavior reinforced  Diversional Activities: television  Taken 3/31/2022 1000 by Priscila Estevez RN  Medical Device Protection: tubing secured  Less Restrictive Alternative:  • bed alarm in use  • appropriate expression promoted  • calming techniques promoted  • emotional support provided  • family presence promoted  • positive reinforcement provided  • safety enhancements provided  De-Escalation Techniques:  • verbally redirected  • stimulation decreased  • reoriented  • family involvement requested  • appropriate behavior reinforced  • diversional activity encouraged  Diversional Activities: television  Taken 3/31/2022 0900 by Priscila Estevez RN  Medical Device Protection: tubing secured  Less Restrictive Alternative:  • bed alarm in use  • appropriate expression promoted  • calming techniques promoted  • emotional support provided  • family presence promoted  • positive reinforcement provided  • safety enhancements provided  De-Escalation Techniques:  • verbally redirected  • stimulation decreased  • reoriented  • family involvement requested  • diversional activity encouraged  • appropriate behavior reinforced  Diversional  Activities: television  Intervention: Protect Dignity, Rights, and Personal Wellbeing  Recent Flowsheet Documentation  Taken 3/31/2022 0900 by Priscila Estevez RN  Trust Relationship/Rapport:  • care explained  • choices provided  Intervention: Protect Skin and Joint Integrity  Recent Flowsheet Documentation  Taken 3/31/2022 1800 by Priscila Estevez RN  Body Position: supine, legs elevated  Taken 3/31/2022 1745 by Priscila Estevez RN  Body Position: sitting up in bed  Taken 3/31/2022 1400 by Priscila Estevez RN  Body Position:  • position changed independently  • sitting up in bed  Taken 3/31/2022 1200 by Priscila Estevez RN  Body Position: supine, legs elevated  Taken 3/31/2022 1000 by Priscila Estevez RN  Body Position: supine, legs elevated   Goal Outcome Evaluation:  Plan of Care Reviewed With: patient        Progress: no change  Outcome Evaluation: pt remains disoriented, agitated, wanting to leave. Very verbally abusive to daughter and staff. Remains in bilateral wrist restraints. BP still elevated but better than before. PRN meds given.  GI consulted, esophagram completed. Still NPO. Will CTM the rest of my shift.

## 2022-04-01 NOTE — CASE MANAGEMENT/SOCIAL WORK
Continued Stay Note  [facility]     Patient Name: Katie Douglas  MRN: [id number]  Today's Date: 4/1/2022    Admit Date: 3/26/2022     Discharge Plan     Row Name 04/01/22 1649       Plan    Plan Awaiting medical readiness following for dc planning SNF vs     Patient/Family in Agreement with Plan other (see comments)    Plan Comments Reviewed clinicals, awaiting medical readiness. Per [facility], pt will need to be restraint free 48 hours prior to dc. CCP to follow up on monday. Ronnie CHRISTIANSON/CCP               Discharge Codes    No documentation.               Expected Discharge Date and Time     Expected Discharge Date Expected Discharge Time    Mar 31, 2022             Lidia Pacheco RN

## 2022-04-01 NOTE — PLAN OF CARE
Goal Outcome Evaluation:  Continues to be agitated throughout shift. Finally resting around 4am. Soft restraints in place, loosened for patient to complete ROM. BP continues to be elevated. IV meds given. Yells out and cusses at staff. Uses racial comments. Several attempts to get OOB unassisted.   Problem: Fall Injury Risk  Goal: Absence of Fall and Fall-Related Injury  Outcome: Ongoing, Progressing  Intervention: Promote Injury-Free Environment  Recent Flowsheet Documentation  Taken 4/1/2022 0222 by Sasha Sawyer RN  Safety Promotion/Fall Prevention:   activity supervised   safety round/check completed  Taken 4/1/2022 0000 by Sasha Sawyer RN  Safety Promotion/Fall Prevention:   activity supervised   safety round/check completed  Taken 3/31/2022 2236 by Sasha Sawyer RN  Safety Promotion/Fall Prevention:   activity supervised   safety round/check completed  Taken 3/31/2022 2202 by Sasha Sawyer RN  Safety Promotion/Fall Prevention:   activity supervised   safety round/check completed  Taken 3/31/2022 2000 by Sasha Sawyer RN  Safety Promotion/Fall Prevention:   activity supervised   safety round/check completed     Problem: Skin Injury Risk Increased  Goal: Skin Health and Integrity  Outcome: Ongoing, Progressing  Intervention: Optimize Skin Protection  Recent Flowsheet Documentation  Taken 4/1/2022 0222 by Sasha Sawyer RN  Head of Bed (HOB) Positioning: HOB at 20-30 degrees  Taken 4/1/2022 0000 by Sasha Sawyer RN  Head of Bed (HOB) Positioning: HOB at 20-30 degrees  Taken 3/31/2022 2236 by Sasha Sawyer RN  Head of Bed (HOB) Positioning: HOB at 30 degrees  Taken 3/31/2022 2202 by Sasha Sawyer RN  Head of Bed (HOB) Positioning: HOB at 30 degrees  Taken 3/31/2022 2000 by Sasha Sawyer RN  Head of Bed (HOB) Positioning: HOB at 30 degrees  Taken 3/31/2022 1936 by Sasha Sawyer RN  Head of Bed (HOB) Positioning: HOB  at 20-30 degrees     Problem: Restraint, Nonbehavioral (Nonviolent)  Goal: Absence of Harm or Injury  Outcome: Ongoing, Progressing  Intervention: Implement Least Restrictive Safety Strategies  Recent Flowsheet Documentation  Taken 4/1/2022 0421 by Sasha Sawyer RN  Medical Device Protection:   tubing secured   IV pole/bag removed from visual field  Less Restrictive Alternative:   bed alarm in use   coping techniques promoted   calming techniques promoted   positive reinforcement provided  De-Escalation Techniques:   verbally redirected   reoriented  Diversional Activities: television  Taken 4/1/2022 0222 by Sasha Sawyer RN  Medical Device Protection:   tubing secured   IV pole/bag removed from visual field  Taken 4/1/2022 0213 by Sasha Sawyer RN  Medical Device Protection:   tubing secured   IV pole/bag removed from visual field  Less Restrictive Alternative:   bed alarm in use   coping techniques promoted   calming techniques promoted   positive reinforcement provided  De-Escalation Techniques:   verbally redirected   physical activity promoted   diversional activity encouraged  Diversional Activities: television  Taken 4/1/2022 0000 by Sasha Sawyer RN  Medical Device Protection:   tubing secured   IV pole/bag removed from visual field  Less Restrictive Alternative:   bed alarm in use   appropriate expression promoted   calming techniques promoted   emotional support provided   family presence promoted   positive reinforcement provided   safety enhancements provided  De-Escalation Techniques:   verbally redirected   reoriented   diversional activity encouraged  Diversional Activities: television  Taken 3/31/2022 2236 by Sasha Sawyer RN  Medical Device Protection: tubing secured  Taken 3/31/2022 2202 by Sasha Sawyer RN  Medical Device Protection:   tubing secured   IV pole/bag removed from visual field  Less Restrictive Alternative:   bed alarm in use    appropriate expression promoted   calming techniques promoted   emotional support provided   family presence promoted   positive reinforcement provided   safety enhancements provided  De-Escalation Techniques:   reoriented   verbally redirected   medication administered   quiet time facilitated  Taken 3/31/2022 2000 by Sasha Sawyer RN  Medical Device Protection:   tubing secured   IV pole/bag removed from visual field  Less Restrictive Alternative:   bed alarm in use   appropriate expression promoted   calming techniques promoted   emotional support provided   family presence promoted   positive reinforcement provided   safety enhancements provided  De-Escalation Techniques:   stimulation decreased   reoriented   verbally redirected   increased round frequency  Diversional Activities: music  Intervention: Protect Skin and Joint Integrity  Recent Flowsheet Documentation  Taken 4/1/2022 0222 by Sasha Sawyer RN  Body Position: position changed independently  Taken 4/1/2022 0000 by Sasha Sawyer RN  Body Position: position changed independently  Taken 3/31/2022 2236 by Sasha Sawyer RN  Body Position: position changed independently  Taken 3/31/2022 2202 by Sasha Sawyer RN  Body Position: position changed independently  Taken 3/31/2022 2000 by Sasha Sawyer RN  Body Position: position changed independently     Problem: Restraint, Nonbehavioral (Nonviolent)  Goal: Absence of Harm or Injury  Intervention: Implement Least Restrictive Safety Strategies  Recent Flowsheet Documentation  Taken 4/1/2022 0421 by Sasha Sawyer RN  Medical Device Protection:   tubing secured   IV pole/bag removed from visual field  Less Restrictive Alternative:   bed alarm in use   coping techniques promoted   calming techniques promoted   positive reinforcement provided  De-Escalation Techniques:   verbally redirected   reoriented  Diversional Activities: television  Taken 4/1/2022  0222 by Sasha Sawyer RN  Medical Device Protection:   tubing secured   IV pole/bag removed from visual field  Taken 4/1/2022 0213 by Sasha Sawyer RN  Medical Device Protection:   tubing secured   IV pole/bag removed from visual field  Less Restrictive Alternative:   bed alarm in use   coping techniques promoted   calming techniques promoted   positive reinforcement provided  De-Escalation Techniques:   verbally redirected   physical activity promoted   diversional activity encouraged  Diversional Activities: television  Taken 4/1/2022 0000 by Sasha Sawyer RN  Medical Device Protection:   tubing secured   IV pole/bag removed from visual field  Less Restrictive Alternative:   bed alarm in use   appropriate expression promoted   calming techniques promoted   emotional support provided   family presence promoted   positive reinforcement provided   safety enhancements provided  De-Escalation Techniques:   verbally redirected   reoriented   diversional activity encouraged  Diversional Activities: television  Taken 3/31/2022 2236 by Sasha Sawyer RN  Medical Device Protection: tubing secured  Taken 3/31/2022 2202 by Sasha Sawyer RN  Medical Device Protection:   tubing secured   IV pole/bag removed from visual field  Less Restrictive Alternative:   bed alarm in use   appropriate expression promoted   calming techniques promoted   emotional support provided   family presence promoted   positive reinforcement provided   safety enhancements provided  De-Escalation Techniques:   reoriented   verbally redirected   medication administered   quiet time facilitated  Taken 3/31/2022 2000 by Sasha Sawyer RN  Medical Device Protection:   tubing secured   IV pole/bag removed from visual field  Less Restrictive Alternative:   bed alarm in use   appropriate expression promoted   calming techniques promoted   emotional support provided   family presence promoted   positive  reinforcement provided   safety enhancements provided  De-Escalation Techniques:   stimulation decreased   reoriented   verbally redirected   increased round frequency  Diversional Activities: music     Problem: Restraint, Nonbehavioral (Nonviolent)  Goal: Absence of Harm or Injury  Intervention: Protect Skin and Joint Integrity  Recent Flowsheet Documentation  Taken 4/1/2022 0222 by Sasha Sawyer RN  Body Position: position changed independently  Taken 4/1/2022 0000 by Sasha Sawyer RN  Body Position: position changed independently  Taken 3/31/2022 2236 by Sasha Sawyer RN  Body Position: position changed independently  Taken 3/31/2022 2202 by Sasha Sawyer RN  Body Position: position changed independently  Taken 3/31/2022 2000 by Sasha Sawyer RN  Body Position: position changed independently           Progress: no change

## 2022-04-01 NOTE — PLAN OF CARE
Goal Outcome Evaluation:  Plan of Care Reviewed With: patient        Progress: no change  Outcome Evaluation: patient has been asleep most of the day, when woken up for meds/vitals, etc she was very agitated and verbally abusive. Will CTM the rest of my shift.

## 2022-04-01 NOTE — CONSULTS
Date of Hospital Visit: 22  Encounter Provider: Neil Orellana MD  Place of Service: [facility] CARDIOLOGY  Patient Name: Katie Douglas  :1931  Referral Provider: Pelon Hanley MD    Chief complaint: Generalized weakness    Reason for Consult:  Pre operative evaluation prior to EGD    History of Present Illness    We apologize -- we do not have record of a call for consult from the  and today when we were called back was the first we knew of the consult.     Ms. Douglas is a 89 year old woman with HTN who presents with LE weakness, generalized weakness, and confusion. She has been diagnosed with a UTI.     She complained of choking with eating and difficulty swallowing. She reported some chest discomfort and pain in her throat from dysphagia. She has a history of esophageal stricture and has had dilation in the past. She has been seen by GI and they plan EGD tomorrow.      We have been consulted for pre-operative clearance.  She is sleeping. She has had intermittent chest discomfort with swallowing. She has been hypertensive but has been often refusing her medications. IV metoprolol is ordered, but her HR is generally ~50bpm. She remains paranoid and confused. She was not reporting any chest pain or SOA at home prior to admission.     Her EKG shows a left bundle branch block but we have no old ones in our EHR or in Care Everywhere. Her daughter thinks she had some cardiac testing at [facility] a few years ago but I cannot get records as medical records is closed.     No Previous Cardiac Testing Found     Past Medical History:   Diagnosis Date   • Depression    • Hyperlipidemia    • Hypertension    • Hypothyroidism    • Osteoarthritis        No past surgical history on file.    Prior to Admission medications    Medication Sig Start Date End Date Taking? Authorizing Provider   atorvastatin (LIPITOR) 40 MG tablet Take 40 mg by mouth Daily. 20  Yes Provider,  "MD Darrin   felodipine (PLENDIL) 10 MG 24 hr tablet Take 1 tablet by mouth once daily 10/12/20  Yes ProviderDarrin MD   levothyroxine (SYNTHROID, LEVOTHROID) 112 MCG tablet Take 1 tablet by mouth Every Morning. 3/31/22  Yes Demi Winchester MD   piroxicam (FELDENE) 20 MG capsule Take 1 capsule by mouth once daily 10/12/20  Yes Darrin Colorado MD   propranolol LA (INDERAL LA) 120 MG 24 hr capsule Take 1 capsule by mouth Daily. 3/30/22  Yes Demi Winchester MD   propranolol LA (INDERAL LA) 80 MG 24 hr capsule TAKE 1 CAPSULE BY MOUTH ONCE DAILY 8/25/20  Yes Darrin Colorado MD   sertraline (ZOLOFT) 100 MG tablet Take 1 tablet by mouth once daily 8/20/20  Yes ProviderDarrin MD   levothyroxine (SYNTHROID, LEVOTHROID) 100 MCG tablet Take 1 tablet by mouth once daily 10/2/20   ProviderDarrin MD   sulfamethoxazole-trimethoprim (Bactrim DS) 800-160 MG per tablet Take 1 tablet by mouth 2 (Two) Times a Day. 3/30/22   Demi Winchester MD       Social History     Socioeconomic History   • Marital status:    Tobacco Use   • Smoking status: Never Smoker   • Smokeless tobacco: Never Used   Vaping Use   • Vaping Use: Never used       History reviewed. No pertinent family history.    Review of Systems   Unable to perform ROS: Patient unresponsive        Objective:     Vitals:    04/01/22 1055 04/01/22 1320 04/01/22 1400 04/01/22 1500   BP: 153/86 158/81 156/79 152/83   BP Location: Right arm Right arm Right arm Right arm   Patient Position: Lying Lying Lying Lying   Pulse: 53 53  54   Resp: 18 18  18   Temp: 98.4 °F (36.9 °C) 98.6 °F (37 °C)  98.5 °F (36.9 °C)   TempSrc: Oral Oral  Oral   SpO2: 93% 95%  95%   Weight:       Height:         Body mass index is 24.27 kg/m².    Last Weight and Admission Weight        03/31/22  1216   Weight: 60.2 kg (132 lb 11.5 oz)     Flowsheet Rows    Flowsheet Row First Filed Value   Admission Height 157.5 cm (62\") Documented at 03/26/2022 2212 "   Admission Weight 60.2 kg (132 lb 11.5 oz) Documented at 03/26/2022 2053            Intake/Output Summary (Last 24 hours) at 4/1/2022 1749  Last data filed at 4/1/2022 0900  Gross per 24 hour   Intake --   Output 1450 ml   Net -1450 ml         Physical Exam  Constitutional:       Comments: Sleeping, flat on back snoring, on room air, no pulmonary distress   HENT:      Head: Normocephalic.      Mouth/Throat:      Mouth: Mucous membranes are dry.   Cardiovascular:      Rate and Rhythm: Normal rate and regular rhythm.      Pulses: Normal pulses.      Heart sounds: Normal heart sounds.   Pulmonary:      Effort: Pulmonary effort is normal.      Breath sounds: Normal breath sounds.   Abdominal:      Palpations: Abdomen is soft.   Musculoskeletal:         General: No swelling.   Neurological:      Comments: sleeping   Psychiatric:      Comments: sleeping                 Lab Review:                Results from last 7 days   Lab Units 04/01/22  0456   SODIUM mmol/L 141   POTASSIUM mmol/L 3.5   CHLORIDE mmol/L 108*   CO2 mmol/L 18.0*   BUN mg/dL 11   CREATININE mg/dL 0.65   GLUCOSE mg/dL 79   CALCIUM mg/dL 9.3     Results from last 7 days   Lab Units 03/31/22  0456 03/30/22 1957 03/26/22  1741   CK TOTAL U/L  --   --  187*   TROPONIN T ng/mL <0.010 <0.010 <0.010     Results from last 7 days   Lab Units 04/01/22  0456   WBC 10*3/mm3 8.63   HEMOGLOBIN g/dL 14.8   HEMATOCRIT % 44.8   PLATELETS 10*3/mm3 236     Results from last 7 days   Lab Units 03/27/22  0432   INR  1.10         Results from last 7 days   Lab Units 04/01/22  0456   MAGNESIUM mg/dL 1.9           EKG this admission    EKG on admission      No Previous EKG found for comparison     I personally viewed and interpreted the patient's EKG/Telemetry data    Assessment/Plan:     1. Atypical chest pain  2. LBBB  3. HTN  4. Confusion  5. Weakness  6. Dysphagia    Unfortunately I cannot get any history out of her, but it from review of the chart and in talking with her  daughter, it sounds like her chest pain is solely related to eating. She has a left bundle but her Tn is normal. As endoscopy is a lower risk procedure, I would not recommend further cardiac testing at this time.    We will see as needed, please let us know if issues arise.

## 2022-04-01 NOTE — NURSING NOTE
Consulted inpatient cardiology again for cardiac clearance for EGD tomorrow, stated Esteban and Shahla are the rounding MDs for today. Cardiology was originally consulted on 3/30 by Malinda CHRISTIANSON and spoke with Alvaro but had not been evaluated yet.

## 2022-04-01 NOTE — PLAN OF CARE
Goal Outcome Evaluation:  Plan of Care Reviewed With: patient        Progress: no change  Outcome Evaluation: pt remains disoriented, agitated, wanting to leave. Very verbally abusive to daughter and staff. Remains in bilateral wrist restraints. GI consulted, esophagram completed. Still NPO. Will CTM the rest of my shift.

## 2022-04-01 NOTE — PLAN OF CARE
Problem: Restraint, Nonbehavioral (Nonviolent)  Goal: Absence of Harm or Injury  4/1/2022 1948 by Priscila Estevez RN  Outcome: Ongoing, Not Progressing  4/1/2022 1819 by Priscila Estevez RN  Outcome: Ongoing, Progressing  Intervention: Implement Least Restrictive Safety Strategies  Recent Flowsheet Documentation  Taken 4/1/2022 1600 by Priscila Estevez RN  Medical Device Protection: tubing secured  Less Restrictive Alternative:  • bed alarm in use  • coping techniques promoted  • calming techniques promoted  • positive reinforcement provided  De-Escalation Techniques:  • verbally redirected  • stimulation decreased  • reoriented  • quiet time facilitated  • increased round frequency  • family involvement requested  • diversional activity encouraged  • appropriate behavior reinforced  Diversional Activities: television  Taken 4/1/2022 1400 by Priscila Estevez RN  Medical Device Protection: tubing secured  Less Restrictive Alternative:  • bed alarm in use  • coping techniques promoted  • calming techniques promoted  • positive reinforcement provided  De-Escalation Techniques:  • verbally redirected  • stimulation decreased  • quiet time facilitated  • reoriented  • increased round frequency  • family involvement requested  • diversional activity encouraged  • appropriate behavior reinforced  Diversional Activities: television  Taken 4/1/2022 1200 by Priscila Estevez RN  Medical Device Protection: tubing secured  Less Restrictive Alternative:  • bed alarm in use  • coping techniques promoted  • calming techniques promoted  • positive reinforcement provided  De-Escalation Techniques:  • verbally redirected  • stimulation decreased  • reoriented  • quiet time facilitated  • increased round frequency  • family involvement requested  • diversional activity encouraged  • appropriate behavior reinforced  Diversional Activities: television  Taken 4/1/2022 1000 by Priscila Estevez RN  Medical Device  Protection: tubing secured  Less Restrictive Alternative:  • bed alarm in use  • coping techniques promoted  • calming techniques promoted  • positive reinforcement provided  De-Escalation Techniques:  • verbally redirected  • stimulation decreased  • reoriented  • physical activity promoted  • quiet time facilitated  • medication administered  • increased round frequency  • family involvement requested  • diversional activity encouraged  • appropriate behavior reinforced  Diversional Activities: television  Taken 4/1/2022 0831 by Priscila Estevez RN  Medical Device Protection: tubing secured  Less Restrictive Alternative:  • bed alarm in use  • coping techniques promoted  • calming techniques promoted  • positive reinforcement provided  De-Escalation Techniques:  • verbally redirected  • stimulation decreased  • reoriented  • medication administered  • increased round frequency  • family involvement requested  • diversional activity encouraged  • appropriate behavior reinforced  Diversional Activities: television  Taken 4/1/2022 0800 by Priscila Estevez RN  Medical Device Protection: tubing secured  Less Restrictive Alternative:  • bed alarm in use  • coping techniques promoted  • calming techniques promoted  • positive reinforcement provided  De-Escalation Techniques:  • verbally redirected  • stimulation decreased  • reoriented  • medication administered  • increased round frequency  • family involvement requested  • diversional activity encouraged  • appropriate behavior reinforced  Diversional Activities: television  Intervention: Protect Dignity, Rights, and Personal Wellbeing  Recent Flowsheet Documentation  Taken 4/1/2022 1400 by Priscila Estevez RN  Trust Relationship/Rapport:  • care explained  • choices provided  Taken 4/1/2022 0800 by Priscila Estevez RN  Trust Relationship/Rapport:  • care explained  • choices provided  Intervention: Protect Skin and Joint Integrity  Recent Flowsheet  Documentation  Taken 4/1/2022 1800 by Priscila Estevez RN  Body Position: sitting up in bed  Taken 4/1/2022 1600 by Priscila Estevez RN  Body Position: supine, legs elevated  Taken 4/1/2022 1400 by Priscila Estevez RN  Body Position: supine, legs elevated  Taken 4/1/2022 1200 by Priscila Estevez RN  Body Position:  • turned  • right  Taken 4/1/2022 1000 by Priscila Estevez RN  Body Position: sitting up in bed  Taken 4/1/2022 0800 by Priscila Estevez RN  Body Position:  • side-lying  • left   Goal Outcome Evaluation:  Plan of Care Reviewed With: patient        Progress: no change  Outcome Evaluation: patient has been asleep most of the day, when woken up for meds/vitals, etc she was very agitated and verbally abusive. MD aware of HTN, unfortunately could not give PRN medications d/t HR >60 the entire shift. Will CTM the rest of my shift.

## 2022-04-02 NOTE — PLAN OF CARE
Goal Outcome Evaluation:  Plan of Care Reviewed With: patient, family        Progress: improving  Outcome Evaluation: Pt A&Ox2 and VSS on RA. C/o pain in head/neck and prn tylenol given with relief of s/s. Diet udpated to clear liquids and pt tolerating well. Propanolol started this shift and metoprolol and labetalol D/C. IVF contineud per MAR. Shower cap provided. Will CTM the rest of my shift.

## 2022-04-02 NOTE — THERAPY TREATMENT NOTE
Patient Name: Katie Douglas  : 1931    MRN: [id number]                              Today's Date: 2022       Admit Date: 3/26/2022    Visit Dx:     ICD-10-CM ICD-9-CM   1. Generalized weakness  R53.1 780.79   2. Decreased activities of daily living (ADL)  Z78.9 V49.89   3. Fall in home, initial encounter  W19.XXXA E888.9    Y92.009 E849.0   4. Acute UTI  N39.0 599.0   5. Metabolic encephalopathy  G93.41 348.31     Patient Active Problem List   Diagnosis   • Generalized weakness   • Hypertension   • Hyperlipidemia   • Hypothyroidism   • Impairment of balance   • Immobility   • Bacteria in urine   • Metabolic encephalopathy   • Dysphagia   • Abnormal esophagram     Past Medical History:   Diagnosis Date   • Depression    • Hyperlipidemia    • Hypertension    • Hypothyroidism    • Osteoarthritis      No past surgical history on file.   General Information     Row Name 22 1644          Physical Therapy Time and Intention    Document Type therapy note (daily note)  -     Mode of Treatment individual therapy;physical therapy  -     Row Name 22 1644          General Information    Patient Profile Reviewed yes  -     Existing Precautions/Restrictions fall  -     Row Name 22 1644          Cognition    Orientation Status (Cognition) oriented to;person;place  -     Row Name 22 1644          Safety Issues, Functional Mobility    Safety Issues Affecting Function (Mobility) insight into deficits/self-awareness;positioning of assistive device;sequencing abilities;safety precautions follow-through/compliance;awareness of need for assistance;safety precaution awareness;judgment  -     Impairments Affecting Function (Mobility) endurance/activity tolerance;strength;balance;cognition;postural/trunk control  -           User Key  (r) = Recorded By, (t) = Taken By, (c) = Cosigned By    Initials Name Provider Type    Pamela Soria, PT Physical Therapist               Mobility      Row Name 04/02/22 1645          Bed Mobility    Bed Mobility supine-sit;sit-supine  -     Supine-Sit [place] (Bed Mobility) minimum assist (75% patient effort);1 person assist;verbal cues  -     Sit-Supine [place] (Bed Mobility) moderate assist (50% patient effort);2 person assist;verbal cues  -     Assistive Device (Bed Mobility) bed rails;head of bed elevated  -     Comment, (Bed Mobility) Shane for trunk elevation into sitting and modA for BLE elevation into supine; VC for hand placement  -     Row Name 04/02/22 1645          Bed-Chair Transfer    Bed-Chair [place] (Transfers) not tested  -     Row Name 04/02/22 1645          Sit-Stand Transfer    Sit-Stand [place] (Transfers) moderate assist (50% patient effort);verbal cues  -     Assistive Device (Sit-Stand Transfers) walker, front-wheeled  -     Comment, (Sit-Stand Transfer) Sit <> stand from bed and BSC, Demonstrates posterior lean with forward flexion, VC for improve upright standing  -     Row Name 04/02/22 1645          Gait/Stairs (Locomotion)    [place] Level (Gait) minimum assist (75% patient effort);moderate assist (50% patient effort)  -     Assistive Device (Gait) walker, front-wheeled  -     Distance in Feet (Gait) 30ft  -     Deviations/Abnormal Patterns (Gait) gait speed decreased;stride length decreased;weight shifting decreased;base of support, narrow  -     Bilateral Gait Deviations forward flexed posture  -     Comment, (Gait/Stairs) With fatigue increased forward flexion/thoracic kyphosis; VC for RW management and safety awareness; tends to keep walker farther away from IVAN and usnteady with turns  -           User Key  (r) = Recorded By, (t) = Taken By, (c) = Cosigned By    Initials Name Provider Type    Pamela Soria PT Physical Therapist               Obj/Interventions     Row Name 04/02/22 1643          Balance    Balance Assessment sitting static balance;standing static  balance;standing dynamic balance  -     Static Sitting Balance standby assist  -     Position, Sitting Balance sitting edge of bed  -     Static Standing Balance minimal assist;moderate assist  -     Dynamic Standing Balance minimal assist;moderate assist  -     Position/Device Used, Standing Balance walker, rolling  -     Comment, Balance Posterior lean in standing VC for improved posture  -           User Key  (r) = Recorded By, (t) = Taken By, (c) = Cosigned By    Initials Name Provider Type    Pamela Soria, PT Physical Therapist               Goals/Plan    No documentation.                Clinical Impression     Row Name 04/02/22 1650          Pain    Pretreatment Pain Rating 0/10 - no pain  -     Posttreatment Pain Rating 0/10 - no pain  -     Row Name 04/02/22 1650          Plan of Care Review    Plan of Care Reviewed With patient  -     Progress no change  -     Outcome Evaluation Participatory in therapy. Performed bed mobility min-modA and transfers with modA. Ambulated 30ft with RW and min-modA. With fatigue presents with increased forward flexed posture and required RW management/safety cueing with turning increasing risk of falls. Functional mobility limited 2/2 pain, decrease strength, balance, and activity tolerance. Pt will continue to benefit from skilled PT to improve upon functional status prior to d/c. Recommend SNF if 24/7 assistance not available.  -     Row Name 04/02/22 1650          Therapy Assessment/Plan (PT)    Rehab Potential (PT) good, to achieve stated therapy goals  -     Criteria for Skilled Interventions Met (PT) yes;meets criteria  -     Row Name 04/02/22 1650          Vital Signs    O2 Delivery Pre Treatment room air  -     O2 Delivery Intra Treatment room air  -     O2 Delivery Post Treatment room air  -     Row Name 04/02/22 1650          Positioning and Restraints    Pre-Treatment Position in bed  -     Post Treatment Position bed  -      In Bed notified nsg;supine;call light within reach;encouraged to call for assist;exit alarm on;with family/caregiver;legs elevated  -MADALYN           User Key  (r) = Recorded By, (t) = Taken By, (c) = Cosigned By    Initials Name Provider Type    Pamela Soria PT Physical Therapist               Outcome Measures     Row Name 04/02/22 1652          How much help from another person do you currently need...    Turning from your back to your side while in flat bed without using bedrails? 3  -KH     Moving from lying on back to sitting on the side of a flat bed without bedrails? 3  -KH     Moving to and from a bed to a chair (including a wheelchair)? 3  -KH     Standing up from a chair using your arms (e.g., wheelchair, bedside chair)? 3  -KH     Climbing 3-5 steps with a railing? 1  -KH     To walk in hospital room? 3  -KH     AM-PAC 6 Clicks Score (PT) 16  -     Row Name 04/02/22 1652          Functional Assessment    Outcome Measure Options AM-PAC 6 Clicks Basic Mobility (PT)  -MADALYN           User Key  (r) = Recorded By, (t) = Taken By, (c) = Cosigned By    Initials Name Provider Type    Pamela Soria PT Physical Therapist                             Physical Therapy Education                 Title: PT OT SLP Therapies (In Progress)     Topic: Physical Therapy (In Progress)     Point: Mobility training (Done)     Learning Progress Summary           Patient Acceptance, E, VU by MADALYN at 4/2/2022 1652   Family Acceptance, E, VU by MADALYN at 4/2/2022 1653      Show all documentation for this point (7)                 Point: Home exercise program (Done)     Learning Progress Summary           Patient Acceptance, E, VU by RAUDEL at 4/2/2022 0609      Show all documentation for this point (4)                 Point: Body mechanics (Done)     Learning Progress Summary           Patient Acceptance, E, VU by MADALYN at 4/2/2022 1652   Family Acceptance, E, VU by MADALYN at 4/2/2022 1653      Show all documentation for this point (7)                  Point: Precautions (In Progress)     Learning Progress Summary           Patient Acceptance, E, VU by JS at 4/2/2022 0609   Family Acceptance, E,D, NR by  at 3/30/2022 1049      Show all documentation for this point (5)                             User Key     Initials Effective Dates Name Provider Type Discipline    PC 06/16/21 -  Renu Engle, PT Physical Therapist PT    JS 09/10/21 -  Sasha Sawyer, RN Registered Nurse Nurse     11/03/21 -  Pamela Diaz, PT Physical Therapist PT              PT Recommendation and Plan  Planned Therapy Interventions (PT): balance training, bed mobility training, gait training, home exercise program, patient/family education, strengthening, transfer training  Plan of Care Reviewed With: patient  Progress: no change  Outcome Evaluation: Participatory in therapy. Performed bed mobility min-modA and transfers with modA. Ambulated 30ft with RW and min-modA. With fatigue presents with increased forward flexed posture and required RW management/safety cueing with turning increasing risk of falls. Functional mobility limited 2/2 pain, decrease strength, balance, and activity tolerance. Pt will continue to benefit from skilled PT to improve upon functional status prior to d/c. Recommend SNF if 24/7 assistance not available.     Time Calculation:    PT Charges     Row Name 04/02/22 1653             Time Calculation    Start Time 1525  -      Stop Time 1600  -KH      Time Calculation (min) 35 min  -KH      PT Received On 04/02/22  -      PT - Next Appointment 04/04/22  -              Time Calculation- PT    Total Timed Code Minutes- PT 35 minute(s)  -KH              Timed Charges    68833 - Gait Training Minutes  15  -KH      39047 - PT Therapeutic Activity Minutes 20  -KH              Total Minutes    Timed Charges Total Minutes 35  -KH       Total Minutes 35  -KH            User Key  (r) = Recorded By, (t) = Taken By, (c) = Cosigned By    Initials Name  Provider Type     Pamela Sarah, PT Physical Therapist              Therapy Charges for Today     Code Description Service Date Service Provider Modifiers Qty    44372435399 HC GAIT TRAINING EA 15 MIN 4/2/2022 Pamela Sarah, PT GP 1    [id number]  PT THERAPEUTIC ACT EA 15 MIN 4/2/2022 Pamela Sarah, PT GP 1          PT G-Codes  Outcome Measure Options: AM-PAC 6 Clicks Basic Mobility (PT)  AM-PAC 6 Clicks Score (PT): 16  AM-PAC 6 Clicks Score (OT): 12  Modified [place] Scale: 4 - Moderately severe disability.  Unable to walk without assistance, and unable to attend to own bodily needs without assistance.    PAMELA SARAH, JULIENNE  4/2/2022

## 2022-04-02 NOTE — NURSING NOTE
This RN went into room to get consent for EGD procedure that is scheduled for today and pt and fam were unaware of procedure. Pt does not want to have scopes done and fam wanted MD to speak to them about procedure. GI MD paged and situation explained. Procedure cancelled and [facility] notified. Will CTM the rest of my shift.

## 2022-04-02 NOTE — PLAN OF CARE
Problem: Restraint, Nonbehavioral (Nonviolent)  Goal: Absence of Harm or Injury  Outcome: Met  Intervention: Implement Least Restrictive Safety Strategies  Recent Flowsheet Documentation  Taken 4/2/2022 0800 by Rosaura Sher RN  Medical Device Protection: tubing secured  Less Restrictive Alternative:   1:1 observation maintained   bed alarm in use  De-Escalation Techniques:   reoriented   stimulation decreased  Diversional Activities: television   Goal Outcome Evaluation:            Pt more alert today and tolerating being out of restraints. MD notified and restraint order D/C. No c/o pain. Will CTM.

## 2022-04-02 NOTE — H&P
"[facility], [facility]  Pre Procedure History & Physical    Chief Complaint:   Dysphagia with possible stricture    Subjective     HPI:   89-year-old female presents to the endoscopy suite with a history of dysphagia especially to meats and esophagram revealing possible Schatzki's ring or stricture at the GE junction.    Past Medical History:   Past Medical History:   Diagnosis Date   • Depression    • Hyperlipidemia    • Hypertension    • Hypothyroidism    • Osteoarthritis        Past Surgical History:  No past surgical history on file.    Family History:  History reviewed. No pertinent family history.    Social History:   reports that she has never smoked. She has never used smokeless tobacco.    Medications:   Medications Prior to Admission   Medication Sig Dispense Refill Last Dose   • atorvastatin (LIPITOR) 40 MG tablet Take 40 mg by mouth Daily.   Past Week at Unknown time   • felodipine (PLENDIL) 10 MG 24 hr tablet Take 1 tablet by mouth once daily   Past Week at Unknown time   • piroxicam (FELDENE) 20 MG capsule Take 1 capsule by mouth once daily   Past Week at Unknown time   • propranolol LA (INDERAL LA) 80 MG 24 hr capsule TAKE 1 CAPSULE BY MOUTH ONCE DAILY   Past Week at Unknown time   • sertraline (ZOLOFT) 100 MG tablet Take 1 tablet by mouth once daily   Past Week at Unknown time   • levothyroxine (SYNTHROID, LEVOTHROID) 100 MCG tablet Take 1 tablet by mouth once daily   Unknown at Unknown time       Allergies:  Bacitracin    ROS:    Pertinent items are noted in HPI, all other systems reviewed and negative     Objective     Blood pressure (!) 200/96, pulse 58, temperature 98.7 °F (37.1 °C), temperature source Axillary, resp. rate 16, height 157.5 cm (62.01\"), weight 60.2 kg (132 lb 11.5 oz), SpO2 94 %.    Physical Exam   Constitutional: Pt is oriented to person, place, and time and well-developed, well-nourished, and in no distress.   Mouth/Throat: Oropharynx is clear and moist.   Neck: " Normal range of motion.   Cardiovascular: Normal rate, regular rhythm and normal heart sounds.    Pulmonary/Chest: Effort normal and breath sounds normal.   Abdominal: Soft. Nontender  Skin: Skin is warm and dry.   Psychiatric: Mood, memory, affect and judgment normal.     Assessment/Plan     Diagnosis:  Dysphagia  Abnormal esophagram    Anticipated Surgical Procedure:  EGD with possible dilation  The risks, benefits, and alternatives of this procedure have been discussed with the patient or the responsible party- the patient understands and agrees to proceed.

## 2022-04-02 NOTE — PROGRESS NOTES
Patient is seen, evaluated, and chart reviewed. Discussed with staff.      Staff reports that patient had been sleeping much of the day yesterday.  She woke up last evening and was somewhat verbally abusive.  No behavioral issues today.  She has been out of restraints all day today.  She was to get an EGD today but has refused.    On examination, patient is found laying in bed.  Family are present for interview.  They feel she is better today.  She is awake, alert and oriented x4.  Mood is depressed and anxious.  Affect congruent.  No SI/HI/AVH.  Thought processes are circumstantial.  Judgment and insight are poor.    No medication side effects.  No further changes at this time.

## 2022-04-02 NOTE — PROGRESS NOTES
Name: Katie Douglas ADMIT: 3/26/2022   : 1931  PCP: Sandrita Godinez MD    MRN: [id number] LOS: 6 days   AGE/SEX: 89 y.o. female  ROOM: [facility]   Subjective   Chief Complaint   Patient presents with   • Weakness - Generalized      Mentation is better. Out of restraints. She feels groggy. No CP or SOA reported. No NVD or abdominal pain.    Objective   Vital Signs  Temp:  [98.4 °F (36.9 °C)-98.9 °F (37.2 °C)] 98.9 °F (37.2 °C)  Heart Rate:  [52-61] 61  Resp:  [16-18] 16  BP: (152-200)/() 170/90  SpO2:  [93 %-97 %] 97 %  on  Flow (L/min):  [2] 2;   Device (Oxygen Therapy): nasal cannula  Body mass index is 24.27 kg/m².    Physical Exam  Vitals and nursing note reviewed.   Constitutional:       General: She is not in acute distress.     Appearance: She is not diaphoretic.   HENT:      Head: Normocephalic and atraumatic.   Eyes:      General:         Right eye: No discharge.         Left eye: No discharge.      Conjunctiva/sclera: Conjunctivae normal.   Cardiovascular:      Rate and Rhythm: Regular rhythm.      Pulses: Normal pulses.   Pulmonary:      Effort: Pulmonary effort is normal.      Breath sounds: Decreased breath sounds present. No wheezing.   Abdominal:      General: There is no distension.      Palpations: Abdomen is soft.      Tenderness: There is no abdominal tenderness. There is no guarding or rebound.   Musculoskeletal:         General: No swelling or tenderness.      Cervical back: Neck supple. No tenderness.   Skin:     General: Skin is warm and dry.   Neurological:      Mental Status: She is alert. She is disoriented.   Psychiatric:         Cognition and Memory: Cognition is impaired. Memory is impaired.         Results Review:       I reviewed the patient's new clinical results.        Results from last 7 days   Lab Units 22  0510 22  0456 22  0456 22   WBC 10*3/mm3 11.02* 8.63 9.08 7.76   HEMOGLOBIN g/dL 14.4 14.8 13.4 13.7   PLATELETS 10*3/mm3 226  236 214 227     Results from last 7 days   Lab Units 04/02/22  0510 04/01/22  0456 03/31/22  0456 03/30/22  1957   SODIUM mmol/L 143 141 140 141   POTASSIUM mmol/L 3.8 3.5 3.3* 3.4*   CHLORIDE mmol/L 107 108* 104 104   CO2 mmol/L 21.0* 18.0* 25.6 25.7   BUN mg/dL 13 11 9 9   CREATININE mg/dL 0.66 0.65 0.64 0.65   GLUCOSE mg/dL 62* 79 97 97   Estimated Creatinine Clearance: 48.4 mL/min (by C-G formula based on SCr of 0.66 mg/dL).  Results from last 7 days   Lab Units 04/02/22 0510 04/01/22 0456 03/31/22 0456 03/30/22 1957 03/28/22 0551 03/27/22 0432 03/26/22  1741   CALCIUM mg/dL 8.7 9.3 9.3 9.2   < > 8.4 9.1   ALBUMIN g/dL  --   --   --   --   --   --  3.90   MAGNESIUM mg/dL 2.6* 1.9 1.8  --   --  1.7  --     < > = values in this interval not displayed.     Results from last 7 days   Lab Units 03/27/22 0432   INR  1.10        atorvastatin, 40 mg, Oral, Daily  ceFAZolin, 2 g, Intravenous, Q8H  felodipine, 10 mg, Oral, Daily  levothyroxine, 112 mcg, Oral, Q AM  metoprolol tartrate, 2.5 mg, Intravenous, Q6H  OLANZapine, 2.5 mg, Oral, BID  sertraline, 100 mg, Oral, Daily  sodium chloride, 10 mL, Intravenous, Q12H      dextrose 5 % and sodium chloride 0.45 %, 75 mL/hr, Last Rate: Stopped (04/02/22 0851)    NPO Diet    Assessment/Plan      Active Hospital Problems    Diagnosis  POA   • **Generalized weakness [R53.1]  Yes   • Immobility [Z74.09]  Yes   • Bacteria in urine [R82.71]  Yes   • Metabolic encephalopathy [G93.41]  Yes   • Dysphagia [R13.10]  Unknown   • Abnormal esophagram [R93.3]  Unknown   • Impairment of balance [R26.89]  Yes   • Hypertension [I10]  Yes   • Hyperlipidemia [E78.5]  Yes   • Hypothyroidism [E03.9]  Yes      Resolved Hospital Problems   No resolved problems to display.       · UTI: Klebsiella sensitive to cephalosporin. Continue Cefazolin. Monitor leukocytosis. She was just confused for a prolonged time but is more alert now and denies respiratory symptoms.  · HTN: Will continue the IV  medications since BP above goal and NPO for EGD. Schedule vasotec. Titrate if needed. Propranolol held for NPO. She was tachycardic initially but then bradycardic with the IV metoprolol.  · Metabolic Encephalopathy: 2/2 UTI. Possible unmasking of underlying dementia or mci.  Improved on the low dose zyprexa. Psychiatry following.  · Dysphagia/Hx Dilation: Schatzki's ring on esophagram. EGD planned. GI following.  · CP: Appreciate cardiology evaluation.  · Hypothyroidism: Synthroid  · HLD: Statin  · PPx: SCD pending procedure  · Disposition: SNF v HH/TBD    Saleem Shetty MD  [facility]  04/02/22  09:42 EDT    Dictated portions using Dragon dictation software.    During the entire encounter, I was wearing recommended PPE including face mask and eye protection. Hand sanitization was performed prior to entering room and upon exit.

## 2022-04-02 NOTE — PLAN OF CARE
Problem: Fall Injury Risk  Goal: Absence of Fall and Fall-Related Injury  Intervention: Identify and Manage Contributors  Recent Flowsheet Documentation  Taken 4/2/2022 0246 by Sasha Sawyer RN  Medication Review/Management: medications reviewed  Taken 4/1/2022 2045 by Sasha Sawyer RN  Medication Review/Management: medications reviewed  Intervention: Promote Injury-Free Environment  Recent Flowsheet Documentation  Taken 4/2/2022 0600 by Sasha Sawyer RN  Safety Promotion/Fall Prevention:   safety round/check completed   activity supervised  Taken 4/2/2022 0246 by Sasha Sawyer RN  Safety Promotion/Fall Prevention:   activity supervised   safety round/check completed  Taken 4/2/2022 0000 by Sasha Sawyer RN  Safety Promotion/Fall Prevention:   activity supervised   safety round/check completed  Taken 4/1/2022 2213 by Sasha Sawyer RN  Safety Promotion/Fall Prevention:   activity supervised   safety round/check completed  Taken 4/1/2022 2045 by Sasha Sawyer RN  Safety Promotion/Fall Prevention:   safety round/check completed   activity supervised     Problem: Fall Injury Risk  Goal: Absence of Fall and Fall-Related Injury  Intervention: Identify and Manage Contributors  Recent Flowsheet Documentation  Taken 4/2/2022 0246 by Sasha Sawyer RN  Medication Review/Management: medications reviewed  Taken 4/1/2022 2045 by Sasha Sawyer RN  Medication Review/Management: medications reviewed  Intervention: Promote Injury-Free Environment  Recent Flowsheet Documentation  Taken 4/2/2022 0600 by Sasha Sawyer RN  Safety Promotion/Fall Prevention:   safety round/check completed   activity supervised  Taken 4/2/2022 0246 by Sasha Sawyer RN  Safety Promotion/Fall Prevention:   activity supervised   safety round/check completed  Taken 4/2/2022 0000 by Sasha Sawyer RN  Safety Promotion/Fall Prevention:   activity  supervised   safety round/check completed  Taken 4/1/2022 2213 by Sasha Sawyer RN  Safety Promotion/Fall Prevention:   activity supervised   safety round/check completed  Taken 4/1/2022 2045 by Sasha Sawyer RN  Safety Promotion/Fall Prevention:   safety round/check completed   activity supervised     Problem: Restraint, Nonbehavioral (Nonviolent)  Goal: Absence of Harm or Injury  Intervention: Implement Least Restrictive Safety Strategies  Recent Flowsheet Documentation  Taken 4/2/2022 0600 by Sasha Sawyer RN  Medical Device Protection:   tubing secured   IV pole/bag removed from visual field  Less Restrictive Alternative:   bed alarm in use   coping techniques promoted   calming techniques promoted   positive reinforcement provided  De-Escalation Techniques:   reoriented   stimulation decreased  Diversional Activities: music  Taken 4/2/2022 0400 by Sasha Sawyer RN  Medical Device Protection:   tubing secured   IV pole/bag removed from visual field  Less Restrictive Alternative:   bed alarm in use   coping techniques promoted   calming techniques promoted   positive reinforcement provided  De-Escalation Techniques:   quiet time facilitated   stimulation decreased  Diversional Activities: television  Taken 4/2/2022 0246 by Sasha Sawyer RN  Medical Device Protection: IV pole/bag removed from visual field  Taken 4/2/2022 0200 by Sasha Sawyer RN  Medical Device Protection:   IV pole/bag removed from visual field   tubing secured  Less Restrictive Alternative:   bed alarm in use   coping techniques promoted   calming techniques promoted   positive reinforcement provided  De-Escalation Techniques: reoriented  Diversional Activities: television  Taken 4/2/2022 0000 by Sasha Sawyer RN  Medical Device Protection:   IV pole/bag removed from visual field   tubing secured  Less Restrictive Alternative:   bed alarm in use   coping techniques promoted   calming  techniques promoted   positive reinforcement provided  De-Escalation Techniques: verbally redirected  Diversional Activities: television  Taken 4/1/2022 2213 by Sasha Sawyer RN  Medical Device Protection:   tubing secured   IV pole/bag removed from visual field  Taken 4/1/2022 2200 by Sasha Sawyer RN  Medical Device Protection: IV pole/bag removed from visual field  Less Restrictive Alternative:   bed alarm in use   coping techniques promoted   calming techniques promoted   positive reinforcement provided  De-Escalation Techniques: reoriented  Diversional Activities: television  Taken 4/1/2022 2045 by Sasha Sawyer RN  Medical Device Protection:   IV pole/bag removed from visual field   tubing secured  Taken 4/1/2022 2010 by Sasha Sawyer RN  Medical Device Protection:   tubing secured   IV pole/bag removed from visual field  Less Restrictive Alternative:   bed alarm in use   coping techniques promoted   calming techniques promoted   positive reinforcement provided  De-Escalation Techniques: reoriented  Diversional Activities: television  Intervention: Protect Skin and Joint Integrity  Recent Flowsheet Documentation  Taken 4/2/2022 0600 by Sasha Sawyer RN  Body Position: weight shifting  Taken 4/2/2022 0246 by Sasha Sawyer RN  Body Position:   turned   right  Taken 4/2/2022 0000 by Sasha Sawyer RN  Body Position:   turned   right  Taken 4/1/2022 2213 by Sasha Sawyer RN  Body Position:   turned   left  Taken 4/1/2022 2045 by Sasha Sawyer RN  Body Position: weight shifting     Problem: Restraint, Nonbehavioral (Nonviolent)  Goal: Absence of Harm or Injury  Intervention: Implement Least Restrictive Safety Strategies  Recent Flowsheet Documentation  Taken 4/2/2022 0600 by Sasha Sawyer RN  Medical Device Protection:   tubing secured   IV pole/bag removed from visual field  Less Restrictive Alternative:   bed alarm in use    coping techniques promoted   calming techniques promoted   positive reinforcement provided  De-Escalation Techniques:   reoriented   stimulation decreased  Diversional Activities: music  Taken 4/2/2022 0400 by Sasha Sawyer RN  Medical Device Protection:   tubing secured   IV pole/bag removed from visual field  Less Restrictive Alternative:   bed alarm in use   coping techniques promoted   calming techniques promoted   positive reinforcement provided  De-Escalation Techniques:   quiet time facilitated   stimulation decreased  Diversional Activities: television  Taken 4/2/2022 0246 by Sasha Sawyer RN  Medical Device Protection: IV pole/bag removed from visual field  Taken 4/2/2022 0200 by Sasha Sawyer RN  Medical Device Protection:   IV pole/bag removed from visual field   tubing secured  Less Restrictive Alternative:   bed alarm in use   coping techniques promoted   calming techniques promoted   positive reinforcement provided  De-Escalation Techniques: reoriented  Diversional Activities: television  Taken 4/2/2022 0000 by Sasha Sawyer RN  Medical Device Protection:   IV pole/bag removed from visual field   tubing secured  Less Restrictive Alternative:   bed alarm in use   coping techniques promoted   calming techniques promoted   positive reinforcement provided  De-Escalation Techniques: verbally redirected  Diversional Activities: television  Taken 4/1/2022 2213 by Sasha Sawyer RN  Medical Device Protection:   tubing secured   IV pole/bag removed from visual field  Taken 4/1/2022 2200 by Sasha Sawyer RN  Medical Device Protection: IV pole/bag removed from visual field  Less Restrictive Alternative:   bed alarm in use   coping techniques promoted   calming techniques promoted   positive reinforcement provided  De-Escalation Techniques: reoriented  Diversional Activities: television  Taken 4/1/2022 2045 by Sasha Sawyer RN  Medical Device  Protection:   IV pole/bag removed from visual field   tubing secured  Taken 4/1/2022 2010 by Sasha Sawyer RN  Medical Device Protection:   tubing secured   IV pole/bag removed from visual field  Less Restrictive Alternative:   bed alarm in use   coping techniques promoted   calming techniques promoted   positive reinforcement provided  De-Escalation Techniques: reoriented  Diversional Activities: television     Problem: Restraint, Nonbehavioral (Nonviolent)  Goal: Absence of Harm or Injury  Intervention: Protect Skin and Joint Integrity  Recent Flowsheet Documentation  Taken 4/2/2022 0600 by Sasha Sawyer RN  Body Position: weight shifting  Taken 4/2/2022 0246 by Sasha Sawyer RN  Body Position:   turned   right  Taken 4/2/2022 0000 by Sasha Sawyer RN  Body Position:   turned   right  Taken 4/1/2022 2213 by Sasha Sawyer RN  Body Position:   turned   left  Taken 4/1/2022 2045 by Sasha Sawyer RN  Body Position: weight shifting     Problem: Adult Inpatient Plan of Care  Goal: Absence of Hospital-Acquired Illness or Injury  Intervention: Prevent Infection  Recent Flowsheet Documentation  Taken 4/2/2022 0600 by Sasha Sawyer RN  Infection Prevention:   single patient room provided   rest/sleep promoted   hand hygiene promoted  Taken 4/2/2022 0246 by Sasha Sawyer RN  Infection Prevention:   single patient room provided   cohorting utilized  Taken 4/2/2022 0000 by Sasha Sawyer RN  Infection Prevention:   single patient room provided   rest/sleep promoted   hand hygiene promoted  Taken 4/1/2022 2213 by Sasha Sawyer RN  Infection Prevention:   single patient room provided   rest/sleep promoted   cohorting utilized  Taken 4/1/2022 2045 by Sasha Sawyer RN  Infection Prevention:   single patient room provided   rest/sleep promoted   hand hygiene promoted     Problem: Adult Inpatient Plan of Care  Goal: Absence of  Hospital-Acquired Illness or Injury  Intervention: Prevent and Manage VTE (Venous Thromboembolism) Risk  Recent Flowsheet Documentation  Taken 4/2/2022 0600 by Sasha Sawyer RN  Activity Management:   activity adjusted per tolerance   activity encouraged  Taken 4/2/2022 0246 by Sasha Sawyer RN  Activity Management:   activity adjusted per tolerance   activity encouraged  Taken 4/2/2022 0000 by Sasha Sawyer RN  Activity Management:   activity adjusted per tolerance   activity encouraged  Taken 4/1/2022 2213 by Sasha Sawyer RN  Activity Management:   activity adjusted per tolerance   activity encouraged  Taken 4/1/2022 2045 by Sasha Sawyer RN  Activity Management:   activity adjusted per tolerance   activity encouraged     Problem: Adult Inpatient Plan of Care  Goal: Absence of Hospital-Acquired Illness or Injury  Intervention: Prevent Skin Injury  Recent Flowsheet Documentation  Taken 4/2/2022 0600 by Sasha Sawyer RN  Body Position: weight shifting  Taken 4/2/2022 0246 by Sasha Sawyer RN  Body Position:   turned   right  Taken 4/2/2022 0000 by Sasha Sawyer RN  Body Position:   turned   right  Taken 4/1/2022 2213 by Sasha Sawyer RN  Body Position:   turned   left  Taken 4/1/2022 2045 by Sasha Sawyer RN  Body Position: weight shifting   Goal Outcome Evaluation:           Progress: no change

## 2022-04-02 NOTE — PLAN OF CARE
Goal Outcome Evaluation:  Plan of Care Reviewed With: patient        Progress: no change  Participatory in therapy. Performed bed mobility min-modA and transfers with modA. Ambulated 30ft with RW and min-modA. With fatigue presents with increased forward flexed posture and required RW management/safety cueing with turning increasing risk of falls. Functional mobility limited 2/2 pain, decrease strength, balance, and activity tolerance. Pt will continue to benefit from skilled PT to improve upon functional status prior to d/c. Recommend SNF if 24/7 assistance not available.

## 2022-04-03 PROBLEM — G93.41 METABOLIC ENCEPHALOPATHY: Status: RESOLVED | Noted: 2022-01-01 | Resolved: 2022-01-01

## 2022-04-03 PROBLEM — F03.92: Status: ACTIVE | Noted: 2022-01-01

## 2022-04-03 NOTE — PLAN OF CARE
Goal Outcome Evaluation:  Plan of Care Reviewed With: patient        Progress: improving  Outcome Evaluation: No change

## 2022-04-03 NOTE — PROGRESS NOTES
[facility]  Inpatient Progress Note    Reason for Follow Up: Dysphagia with possible stricture     Subjective     Interval History:   I had a long discussion with the patient and with her family.  I went over the results of her barium swallow that showed a Schatzki's ring.  Patient is not interested in having an EGD.  She sounds paranoid to me.    Current Facility-Administered Medications:   •  acetaminophen (TYLENOL) tablet 650 mg, 650 mg, Oral, Q4H PRN, 650 mg at 04/02/22 1613 **OR** acetaminophen (TYLENOL) 160 MG/5ML solution 650 mg, 650 mg, Oral, Q4H PRN **OR** acetaminophen (TYLENOL) suppository 650 mg, 650 mg, Rectal, Q4H PRN, Jennifer Sanchez APRN  •  atorvastatin (LIPITOR) tablet 40 mg, 40 mg, Oral, Daily, Pelon Hanley MD, 40 mg at 04/02/22 0844  •  enalaprilat (VASOTEC) injection 1.25 mg, 1.25 mg, Intravenous, Q6H PRN, Saleem Shetty MD  •  famotidine (PEPCID) tablet 20 mg, 20 mg, Oral, BID AC, Konstantin Parmar MD  •  felodipine (PLENDIL) 24 hr tablet 10 mg, 10 mg, Oral, Daily, Pelon Hanley MD, 10 mg at 04/02/22 0844  •  [START ON 4/4/2022] levothyroxine (SYNTHROID, LEVOTHROID) tablet 100 mcg, 100 mcg, Oral, Q AM, Konstantin Parmar MD  •  nitroglycerin (NITROSTAT) SL tablet 0.4 mg, 0.4 mg, Sublingual, Q5 Min PRN, Jennifer Sanchez, APRN  •  OLANZapine (zyPREXA) injection 5 mg, 5 mg, Intramuscular, Q8H PRN, Saleem Shetty MD, 5 mg at 03/31/22 2144  •  OLANZapine (zyPREXA) tablet 2.5 mg, 2.5 mg, Oral, BID, Saleem Shetty MD, 2.5 mg at 04/02/22 0845  •  ondansetron (ZOFRAN) injection 4 mg, 4 mg, Intravenous, Q6H PRN, Jennifer Sanchez, APRN  •  propranolol LA (INDERAL LA) 24 hr capsule 80 mg, 80 mg, Oral, Daily, Saleem Shetty MD, 80 mg at 04/02/22 1433  •  sertraline (ZOLOFT) tablet 100 mg, 100 mg, Oral, Daily, Pelon Hanley MD, 100 mg at 04/02/22 5514  •  [COMPLETED] Insert peripheral IV, , , Once **AND** sodium chloride 0.9 %  flush 10 mL, 10 mL, Intravenous, PRN, Ja Adorno MD  •  sodium chloride 0.9 % flush 10 mL, 10 mL, Intravenous, Q12H, Jennifer Sanchez APRN, 10 mL at 04/03/22 0859  •  sodium chloride 0.9 % flush 10 mL, 10 mL, Intravenous, PRN, Jennifer Sanchez APRN  Review of Systems:    The following systems were reviewed and negative;  constitution, ENT, respiratory, musculoskeletal and neurological    Objective     Vital Signs  Temp:  [97.4 °F (36.3 °C)-98.8 °F (37.1 °C)] 97.6 °F (36.4 °C)  Heart Rate:  [52-64] 64  Resp:  [16-22] 22  BP: (128-194)/() 192/91  Body mass index is 24.27 kg/m².    Intake/Output Summary (Last 24 hours) at 4/3/2022 1400  Last data filed at 4/2/2022 1700  Gross per 24 hour   Intake 240 ml   Output 300 ml   Net -60 ml     No intake/output data recorded.     Physical Exam:   General: patient awake, alert and cooperative   Eyes: Normal lids and lashes, no scleral icterus   Neck: supple, normal ROM   Skin: warm and dry, not jaundiced   Cardiovascular: regular rhythm and rate, no murmurs auscultated   Pulm: clear to auscultation bilaterally, regular and unlabored   Abdomen: soft, nontender, nondistended; normal bowel sounds   Extremities: no rash or edema   Psychiatric: Normal mood and behavior; memory intact     Results Review:     I reviewed the patient's new clinical results.    Results from last 7 days   Lab Units 04/03/22  0656 04/02/22  0510 04/01/22  0456   WBC 10*3/mm3 9.87 11.02* 8.63   HEMOGLOBIN g/dL 12.9 14.4 14.8   HEMATOCRIT % 40.3 43.5 44.8   PLATELETS 10*3/mm3 215 226 236     Results from last 7 days   Lab Units 04/03/22  0656 04/02/22  0510 04/01/22  0456   SODIUM mmol/L 141 143 141   POTASSIUM mmol/L 3.2* 3.8 3.5   CHLORIDE mmol/L 108* 107 108*   CO2 mmol/L 23.0 21.0* 18.0*   BUN mg/dL 11 13 11   CREATININE mg/dL 0.67 0.66 0.65   CALCIUM mg/dL 8.6 8.7 9.3   GLUCOSE mg/dL 118* 62* 79         Lab Results   Lab Value Date/Time    LIPASE 18 03/26/2022 1741       Radiology:  [place]  Esophagram Complete Single Contrast   Final Result      CT Head Without Contrast   Final Result   Evidence of extensive small vessel chronic ischemic changes   described. No acute intracranial abnormality is identified.       This report was finalized on 3/26/2022 6:38 PM by Dr. Lam Chambers M.D.          CT Cervical Spine Without Contrast   Final Result   Extensive multilevel degenerative disc changes as noted.   There is no evidence of acute fracture.       This report was finalized on 3/26/2022 6:47 PM by Dr. Lam Chambers M.D.          CT Lumbar Spine Without Contrast   Final Result   Extensive multilevel degenerative disc changes in the lumbar   spine producing dextroscoliosis. Chronic compression fracture of T12. No   acute fractures are identified. Degenerative disc changes and facet   joint arthropathy result in significant central canal stenosis at the   lower 3 lumbar levels.       This report was finalized on 3/26/2022 6:36 PM by Dr. Lam Chambers M.D.              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  Dysphagia with a barium swallow that shows a Schatzki's ring.  The patient does not want an EGD.  At a long discussion with the patient and with her family.  Advance her diet as tolerated.  2.  The patient sounds paranoid.    GI will sign off and see as needed.    I discussed the patients findings and my recommendations with patient and family.    Ernie Santiago MD

## 2022-04-03 NOTE — PROGRESS NOTES
"    DAILY PROGRESS NOTE  [facility]    Patient Identification:  Name: Katie Douglas  Age: 89 y.o.  Sex: female  :  1931  MRN: [id number]         Primary Care Physician: Sandrita Godinez MD    Subjective:  Interval History: History and ROS not the most reliable.  She can actually answer some of my orientation questions correctly.  Her behavior and her speech can be unpredictable at times.  She is not wanting to comply with much of what anyone here has to say.  She seems very paranoid and feels that everyone here is out to get her.  She is accompanied by family x2 at bedside they were very helpful as well as providing insight to pre-existing mentation issues.    Objective: Case discussed with RN.  Noncompliance noted  I advised RN to be patient    Scheduled Meds:atorvastatin, 40 mg, Oral, Daily  famotidine, 20 mg, Oral, BID AC  felodipine, 10 mg, Oral, Daily  [START ON 2022] levothyroxine, 100 mcg, Oral, Q AM  OLANZapine, 2.5 mg, Oral, BID  propranolol LA, 80 mg, Oral, Daily  sertraline, 100 mg, Oral, Daily  sodium chloride, 10 mL, Intravenous, Q12H      Continuous Infusions:     Vital signs in last 24 hours:  Temp:  [97.4 °F (36.3 °C)-98.8 °F (37.1 °C)] 97.6 °F (36.4 °C)  Heart Rate:  [52-67] 52  Resp:  [16-18] 16  BP: (128-194)/(67-88) 194/88    Intake/Output:    Intake/Output Summary (Last 24 hours) at 4/3/2022 1140  Last data filed at 2022 1700  Gross per 24 hour   Intake 240 ml   Output 300 ml   Net -60 ml       Exam:  BP (!) 194/88 (BP Location: Right arm, Patient Position: Lying)   Pulse 52   Temp 97.6 °F (36.4 °C) (Oral)   Resp 16   Ht 157.5 cm (62.01\")   Wt 60.2 kg (132 lb 11.5 oz)   SpO2 95%   BMI 24.27 kg/m²     General Appearance:    Alert, cooperative, fluent speech, AAOx3 but definitely paranoid and not at baseline mentation.  Accusation all at times                          Head:    Normocephalic, without obvious abnormality, atraumatic                          "  Eyes:    PERRL, conjunctivae/corneas clear, EOM's intact, both eyes                         Throat:   Oral mucosa pink and moist                           Neck:   Supple, no meningismus or JVD                         Lungs:    Decreased otherwise clear to auscultation bilaterally, respirations unlabored                         Heart:    Regular rate and rhythm, S1 and S2 normal                 Abdomen:     Soft, nontender, bowel sounds active                  Extremities:   Extremities normal, atraumatic, no cyanosis or edema                        Pulses:   Pulses palpable in all extremities                            Skin:   Skin is warm and dry                  Neurologic:   CNII-XII intact,  moving all with no obvious focal deficits .    Data Review:  Labs in chart were reviewed.    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) [F03.90, F06.30]  Unknown   • Generalized weakness [R53.1]  Yes   • Immobility [Z74.09]  Yes   • Bacteria in urine [R82.71]  Yes   • Dysphagia [R13.10]  Unknown   • Abnormal esophagram [R93.3]  Unknown   • Impairment of balance [R26.89]  Yes   • Hypertension [I10]  Yes   • Hyperlipidemia [E78.5]  Yes   • Hypothyroidism [E03.9]  Yes      Resolved Hospital Problems    Diagnosis Date Resolved POA   • Metabolic encephalopathy [G93.41] 04/03/2022 Yes       Plan:    For me this seems concerning for probable underlying dementia with agitation and paranoid behavior concerning for underlying psychiatric etiologies   -I am not sure this is metabolic encephalopathy.  If this was UTI related patient would have shown further improvement and it seems that she is showing a waxing and waning type improvement which is consistent with dementia   -I think it would be beneficial for patient to be reevaluated by neurology given current condition   -Psychiatry following -on Zyprexa 2.5 twice daily -await further  recommendations as seems to need additional treatment.  Last dose IM Zyprexa 3/31/2022   -TSH mildly elevated -reduce levothyroxine from 112-100 mcg   -Afebrile -RA/2 L    Atypical chest pain -no further work-up endorsed per cardiology    HTN -labile -made worse by compliance    Dysphagia with abnormal esophagram -GI consulted and following -diet currently clears -okay with full liquid diet   -Add Pepcid        Disposition -not safe for discharge at this time    >30m spent counseling and coordinating care for this patient    Konstantin Parmar MD  4/3/2022  11:40 EDT

## 2022-04-03 NOTE — PROGRESS NOTES
"Patient is seen, evaluated, and chart reviewed. Discussed with staff.      Staff reports that patient has been cooperative and compliant with medications.  No behavioral issues.  She has not required restraints or any as needed medication for agitation.    On examination, patient is found sitting up in bed.  Family and friends are at bedside.  Patient is alert and oriented x3.  Mood is \"okay.\"  Affect congruent.  No SI/HI/AVH.  Patient displays some paranoia in the form of feeling like things are being put into her food.  She also describes increased confusion at nighttime.  Thought processes are circumstantial.  Judgment and insight are impaired.    No medication side effects.  No further changes at this time.  "

## 2022-04-03 NOTE — PLAN OF CARE
Goal Outcome Evaluation:  Plan of Care Reviewed With: patient        Progress: no change  Outcome Evaluation: Pt alert to self, but became agitated this morning and refused meds throughout the day. Pt became verbally abusive towards staffing, calling names and telling us to get out of the room when trying to provide care. MD notified and neuro consulted to come see pt as confusion is worsening after course of abx. Diet advanced to full liquid. C/o headache but did not want any meds. Will CTM the rest of my shift.

## 2022-04-04 NOTE — PLAN OF CARE
Goal Outcome Evaluation:  Plan of Care Reviewed With: patient        Progress: no change  Outcome Evaluation: Confused, Alert to self/sit. Agitated and wanting to go home. Refusing meds/vitals. Neuro to see today. Will CTM

## 2022-04-04 NOTE — PROGRESS NOTES
"    DAILY PROGRESS NOTE  [facility]    Patient Identification:  Name: Katie Douglas  Age: 89 y.o.  Sex: female  :  1931  MRN: [id number]         Primary Care Physician: Sandrita Godinez MD    Subjective:  Interval History: Seems much more relaxed today.  She occasionally answers my orientation questions correctly.  She is not making great eye contact almost keeping him close the entire time we try to conversation.  No family present at bedside though RN was present and case was discussed at length.  Patient denies any new issues.  She states she does not feel agitated    Objective: Elderly and frail.  Nontoxic.    Scheduled Meds:atorvastatin, 40 mg, Oral, Daily  famotidine, 20 mg, Oral, BID AC  felodipine, 10 mg, Oral, Daily  levothyroxine, 100 mcg, Oral, Q AM  OLANZapine, 2.5 mg, Oral, BID  propranolol LA, 80 mg, Oral, Daily  sertraline, 100 mg, Oral, Daily  sodium chloride, 10 mL, Intravenous, Q12H      Continuous Infusions:     Vital signs in last 24 hours:  Temp:  [97.6 °F (36.4 °C)-97.7 °F (36.5 °C)] 97.6 °F (36.4 °C)  Heart Rate:  [54-75] 54  Resp:  [18-22] 18  BP: (142-192)/() 187/89    Intake/Output:    Intake/Output Summary (Last 24 hours) at 2022 1039  Last data filed at 2022 0900  Gross per 24 hour   Intake --   Output 920 ml   Net -920 ml       Exam:  BP (!) 187/89 (BP Location: Right arm, Patient Position: Lying)   Pulse 54   Temp 97.6 °F (36.4 °C) (Oral)   Resp 18   Ht 157.5 cm (62.01\")   Wt 60.2 kg (132 lb 11.5 oz)   SpO2 100%   BMI 24.27 kg/m²     General Appearance:    Alert, cooperative, follows commands, much less agitated, AAOx3 but incorrect at times                          Head:    Normocephalic, without obvious abnormality, atraumatic, mild bitemporal losses                         Throat:   Lips, tongue, gums normal; oral mucosa pink and moist                           Neck:   No JVD                         Lungs:    Clear to auscultation " bilaterally, respirations unlabored                          Heart:    Regular rate and rhythm, S1 and S2 normal                  Abdomen:     Soft, nontender, bowel sounds active                 Extremities: Moving all, no cyanosis or edema                        Pulses:   Pulses palpable in all extremities                            Skin:   Skin is warm and dry   Data Review:  Labs in chart were reviewed.    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) [F03.90, F06.30]  Unknown   • Generalized weakness [R53.1]  Yes   • Immobility [Z74.09]  Yes   • Bacteria in urine [R82.71]  Yes   • Dysphagia [R13.10]  Unknown   • Abnormal esophagram [R93.3]  Unknown   • Impairment of balance [R26.89]  Yes   • Hypertension [I10]  Yes   • Hyperlipidemia [E78.5]  Yes   • Hypothyroidism [E03.9]  Yes      Resolved Hospital Problems    Diagnosis Date Resolved POA   • Metabolic encephalopathy [G93.41] 04/03/2022 Yes       Plan:    I still feel we are dealing with underlying dementia with agitation and paranoid behavior concerning for additional psychiatric etiology/psychosis   -Psych dosing Zyprexa twice daily -no new med changes   -I have asked for neurology to come back onto the case   -Patient definitely seems to behave differently this morning with none of the same agitation and paranoid behavior accuracy yesterday.  She may answer the bulk of your orientation questions correctly but she also messes up numerous times per my discussion with RN at bedside.  I also stepped out of the room with RN to specifically have her observe patient behavior with family in the room as patient seemed completely different this morning when family was not present   -This is not metabolic encephalopathy secondary to UTI which has been treated as mentation and behavior issues persist as well as come and go      Hypothyroidism -levothyroxine reduced from 112-100 mcg  with need for recheck in the next 4 to 6 weeks    HTN labile secondary to mood    Replace K    Dysphagia with abnormal esophagram   -Pepcid added and GI consulted -signed off stating patient does not want EGD        Disposition -possibly in the next day or 2.  Need to formulate safe discharge.  CCP working on that    Konstantin Parmar MD  4/4/2022  10:39 EDT

## 2022-04-04 NOTE — PROGRESS NOTES
The patient cannot be awakened for interview today.  Charting indicates that she has been doing somewhat better, but has persisted in her belief that her food is being poisoned.  Continuing to follow.

## 2022-04-04 NOTE — PLAN OF CARE
Goal Outcome Evaluation:  Plan of Care Reviewed With: patient        Progress: no change  Outcome Evaluation: Patient is agreeable to PT and pleasant during session. She completed bed mobility with CGA and STSx3 to rwx requiring min-modA. Cues for upright posture as she has severe posterior lean in standing. She ambulated 10ft using rwx requiring min-modA today with increased steadiness throughout gait. MaxA from PT to maintain safe distance to rwx and to assist with turns. She will continue to benefit from skilled PT to address functional deficits.

## 2022-04-04 NOTE — THERAPY TREATMENT NOTE
Patient Name: Katie Douglas  : 1931    MRN: [id number]                              Today's Date: 2022       Admit Date: 3/26/2022    Visit Dx:     ICD-10-CM ICD-9-CM   1. Generalized weakness  R53.1 780.79   2. Decreased activities of daily living (ADL)  Z78.9 V49.89   3. Fall in home, initial encounter  W19.XXXA E888.9    Y92.009 E849.0   4. Acute UTI  N39.0 599.0   5. 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   General Information     Row Name 22 1318          Physical Therapy Time and Intention    Document Type therapy note (daily note)  -CB     Mode of Treatment individual therapy;physical therapy  -CB     Row Name 22 1318          General Information    Patient Profile Reviewed yes  -CB     Existing Precautions/Restrictions fall  -CB     Row Name 22 1318          Cognition    Orientation Status (Cognition) oriented to;person;place  -CB     Row Name 22 1318          Safety Issues, Functional Mobility    Safety Issues Affecting Function (Mobility) insight into deficits/self-awareness;awareness of need for assistance;judgment;positioning of assistive device;problem-solving;safety precaution awareness;safety precautions follow-through/compliance;sequencing abilities  -CB     Impairments Affecting Function (Mobility) endurance/activity tolerance;strength;balance;cognition;postural/trunk control  -CB           User Key  (r) = Recorded By, (t) = Taken By, (c) = Cosigned By    Initials Name Provider Type    CB  Ivonne Camargo, PT Physical Therapist               Mobility     Row Name 04/04/22 1318          Bed Mobility    Bed Mobility supine-sit  -CB     Supine-Sit Morton (Bed Mobility) contact guard;verbal cues;nonverbal cues (demo/gesture)  -CB     Assistive Device (Bed Mobility) bed rails;head of bed elevated  -CB     Row Name 04/04/22 1318          Sit-Stand Transfer    Sit-Stand Morton (Transfers) minimum assist (75% patient effort);moderate assist (50% patient effort);verbal cues;nonverbal cues (demo/gesture)  -CB     Assistive Device (Sit-Stand Transfers) walker, front-wheeled  -CB     Comment, (Sit-Stand Transfer) STSx3; posterior lean in standing with cues for upright posture; cue for UE placement  -CB     Row Name 04/04/22 1318          Gait/Stairs (Locomotion)    Morton Level (Gait) minimum assist (75% patient effort);moderate assist (50% patient effort)  -CB     Assistive Device (Gait) walker, front-wheeled  -CB     Distance in Feet (Gait) 10ft  -CB     Deviations/Abnormal Patterns (Gait) gait speed decreased;stride length decreased;weight shifting decreased;base of support, narrow  -CB     Bilateral Gait Deviations forward flexed posture  -CB     Comment, (Gait/Stairs) excessive forward flexion with cues, maxA from PT to move rwx and cues for safety with turns using rwx  -CB           User Key  (r) = Recorded By, (t) = Taken By, (c) = Cosigned By    Initials Name Provider Type    CB Ivonne Camargo PT Physical Therapist               Obj/Interventions     Row Name 04/04/22 1323          Balance    Balance Assessment sitting static balance;sitting dynamic balance;standing static balance;standing dynamic balance  -CB     Static Sitting Balance standby assist  -CB     Dynamic Sitting Balance contact guard  -CB     Position, Sitting Balance unsupported;sitting edge of bed  -CB     Static Standing Balance minimal assist;moderate assist;verbal cues;non-verbal cues (demo/gesture)  -CB     Dynamic  Standing Balance minimal assist;moderate assist;verbal cues;non-verbal cues (demo/gesture)  -CB     Position/Device Used, Standing Balance supported;walker, rolling  -CB     Balance Interventions sitting;standing;sit to stand;supported;static;dynamic;minimal challenge  -CB     Comment, Balance severe posterior lean in standing  -CB           User Key  (r) = Recorded By, (t) = Taken By, (c) = Cosigned By    Initials Name Provider Type    CB Ivonne Camargo, PT Physical Therapist               Goals/Plan    No documentation.                Clinical Impression     Row Name 04/04/22 1324          Pain    Pretreatment Pain Rating 7/10  -CB     Posttreatment Pain Rating 7/10  -CB     Pain Location - back  -CB     Pain Intervention(s) Repositioned;Rest  -CB     Row Name 04/04/22 1324          Plan of Care Review    Plan of Care Reviewed With patient  -CB     Progress no change  -CB     Outcome Evaluation Patient is agreeable to PT and pleasant during session. She completed bed mobility with CGA and STSx3 to rwx requiring min-modA. Cues for upright posture as she has severe posterior lean in standing. She ambulated 10ft using rwx requiring min-modA today with increased steadiness throughout gait. MaxA from PT to maintain safe distance to rwx and to assist with turns. She will continue to benefit from skilled PT to address functional deficits.  -CB     Row Name 04/04/22 1324          Vital Signs    Recovery Time all VSS  -CB     Row Name 04/04/22 1324          Positioning and Restraints    Pre-Treatment Position in bed  -CB     Post Treatment Position chair  -CB     In Chair notified nsg;reclined;call light within reach;encouraged to call for assist;exit alarm on;legs elevated  -CB           User Key  (r) = Recorded By, (t) = Taken By, (c) = Cosigned By    Initials Name Provider Type    Ivonne Terrazas, PT Physical Therapist               Outcome Measures     Row Name 04/04/22 1327          How much help from another person  do you currently need...    Turning from your back to your side while in flat bed without using bedrails? 3  -CB     Moving from lying on back to sitting on the side of a flat bed without bedrails? 3  -CB     Moving to and from a bed to a chair (including a wheelchair)? 3  -CB     Standing up from a chair using your arms (e.g., wheelchair, bedside chair)? 3  -CB     Climbing 3-5 steps with a railing? 1  -CB     To walk in hospital room? 3  -CB     AM-PAC 6 Clicks Score (PT) 16  -CB     Row Name 04/04/22 1327          Functional Assessment    Outcome Measure Options AM-PAC 6 Clicks Basic Mobility (PT)  -CB           User Key  (r) = Recorded By, (t) = Taken By, (c) = Cosigned By    Initials Name Provider Type    CB Ivonne Camargo, PT Physical Therapist                             Physical Therapy Education                 Title: PT OT SLP Therapies (In Progress)     Topic: Physical Therapy (In Progress)     Point: Mobility training (In Progress)     Learning Progress Summary           Patient Acceptance, E,TB,D, NR by CB at 4/4/2022 1328   Family Acceptance, E, VU by  at 4/2/2022 1653      Show all documentation for this point (9)                 Point: Home exercise program (Done)     Learning Progress Summary           Patient Acceptance, E, VU,NR,NL by KH1 at 4/4/2022 0954      Show all documentation for this point (5)                 Point: Body mechanics (In Progress)     Learning Progress Summary           Patient Acceptance, E,TB,D, NR by CB at 4/4/2022 1328   Family Acceptance, E, VU by  at 4/2/2022 1653      Show all documentation for this point (9)                 Point: Precautions (In Progress)     Learning Progress Summary           Patient Acceptance, E,TB,D, NR by CB at 4/4/2022 1328   Family Acceptance, E,D, NR by PC at 3/30/2022 1049      Show all documentation for this point (7)                             User Key     Initials Effective Dates Name Provider Type Discipline    PC 06/16/21 -   Renu Engle, PT Physical Therapist PT    KH1 10/20/20 -  Elaine Roman, RN Registered Nurse Nurse     10/22/21 -  Ivonne Camargo, PT Physical Therapist PT     11/03/21 -  Pamela Diaz, PT Physical Therapist PT              PT Recommendation and Plan     Plan of Care Reviewed With: patient  Progress: no change  Outcome Evaluation: Patient is agreeable to PT and pleasant during session. She completed bed mobility with CGA and STSx3 to rwx requiring min-modA. Cues for upright posture as she has severe posterior lean in standing. She ambulated 10ft using rwx requiring min-modA today with increased steadiness throughout gait. MaxA from PT to maintain safe distance to rwx and to assist with turns. She will continue to benefit from skilled PT to address functional deficits.     Time Calculation:    PT Charges     Row Name 04/04/22 1328             Time Calculation    Start Time 1256  -CB      Stop Time 1312  -CB      Time Calculation (min) 16 min  -CB      PT Received On 04/04/22  -CB      PT - Next Appointment 04/05/22  -CB              Time Calculation- PT    Total Timed Code Minutes- PT 16 minute(s)  -CB              Timed Charges    77108 - PT Therapeutic Activity Minutes 16  -CB              Total Minutes    Timed Charges Total Minutes 16  -CB       Total Minutes 16  -CB            User Key  (r) = Recorded By, (t) = Taken By, (c) = Cosigned By    Initials Name Provider Type    CB Ivonne Camargo, PT Physical Therapist              Therapy Charges for Today     Code Description Service Date Service Provider Modifiers Qty    [id number]  PT THERAPEUTIC ACT EA 15 MIN 4/4/2022 Ivonne Camargo, PT GP 1          PT G-Codes  Outcome Measure Options: AM-PAC 6 Clicks Basic Mobility (PT)  AM-PAC 6 Clicks Score (PT): 16  AM-PAC 6 Clicks Score (OT): 12  Modified Corson Scale: 4 - Moderately severe disability.  Unable to walk without assistance, and unable to attend to own bodily needs without assistance.    Ivonne  Raman, PT  4/4/2022

## 2022-04-04 NOTE — PLAN OF CARE
Goal Outcome Evaluation:  Plan of Care Reviewed With: patient           Outcome Evaluation: Patient has been alert and awake to staff and situation this shift becoming more oriented throughout shift.  Addressing patient in a clear voice, slowly explaining all care without distraction face to face patient has been smiling and agreeable, accepting all po medications and ADL assistance this shift.  Working with PT ambulating to recliner this shift, spent most of afternoon up in recliner Ax1 with gb and RW to [facility], remained on purewick.  Calm and cooperative this shift with all staff, no c/o discomfort or pain.  Daughter arrived at 1800 this evening patient has been asleep, unaware family visited, will CTM.  All needs are in reach, safety measures intact bed alarm activated call light in hand.

## 2022-04-04 NOTE — PROGRESS NOTES
Patient Identification:  NAME:  Katie Douglas  Age:  89 y.o.   Sex:  female   :  1931   MRN:  [id number]       Chief complaint: Metabolic encephalopathy, confusion psychosis with paranoia    History of present illness: She has had some waxing and waning confusion but at this moment is awake alert well oriented and joking with me and very interactive.  She does not seem to be paranoid at this time and that is much improved compared to yesterday per chart notes    Past medical history:  Past Medical History:   Diagnosis Date   • Depression    • Hyperlipidemia    • Hypertension    • Hypothyroidism    • Osteoarthritis        Allergies:  Bacitracin    Home medications:  Medications Prior to Admission   Medication Sig Dispense Refill Last Dose   • atorvastatin (LIPITOR) 40 MG tablet Take 40 mg by mouth Daily.   Past Week at Unknown time   • felodipine (PLENDIL) 10 MG 24 hr tablet Take 1 tablet by mouth once daily   Past Week at Unknown time   • piroxicam (FELDENE) 20 MG capsule Take 1 capsule by mouth once daily   Past Week at Unknown time   • propranolol LA (INDERAL LA) 80 MG 24 hr capsule TAKE 1 CAPSULE BY MOUTH ONCE DAILY   Past Week at Unknown time   • sertraline (ZOLOFT) 100 MG tablet Take 1 tablet by mouth once daily   Past Week at Unknown time   • levothyroxine (SYNTHROID, LEVOTHROID) 100 MCG tablet Take 1 tablet by mouth once daily   Unknown at Unknown time        Hospital medications:  atorvastatin, 40 mg, Oral, Daily  famotidine, 20 mg, Oral, BID AC  felodipine, 10 mg, Oral, Daily  levothyroxine, 100 mcg, Oral, Q AM  OLANZapine, 2.5 mg, Oral, BID  potassium chloride, 40 mEq, Oral, Q4H  propranolol LA, 80 mg, Oral, Daily  sertraline, 100 mg, Oral, Daily  sodium chloride, 10 mL, Intravenous, Q12H         •  acetaminophen **OR** acetaminophen **OR** acetaminophen  •  enalaprilat  •  nitroglycerin  •  OLANZapine  •  ondansetron  •  [COMPLETED] Insert peripheral IV **AND** sodium chloride  •  sodium  chloride      Objective:  Vitals Ranges:   Temp:  [97.6 °F (36.4 °C)-97.7 °F (36.5 °C)] 97.6 °F (36.4 °C)  Heart Rate:  [54-75] 54  Resp:  [18] 18  BP: (142-187)/(89-95) 187/89      Physical Exam:  She is awake alert and actually oriented x3 normal cranial nerves II through VII good  strength and toe wiggle reflexes trace toes downgoing    Results review:   I reviewed the patient's new clinical results.    Data review:  Lab Results (last 24 hours)     ** No results found for the last 24 hours. **           Imaging:  Imaging Results (Last 24 Hours)     ** No results found for the last 24 hours. **         PPE worn at all times washed before washed up afterwards disposed of everything properly is now within 6 feet of her for more than few minutes during my exam    Assessment and Plan:     This patient has some waxing and waning metabolic encephalopathy which has improved and currently is awake alert and oriented x3.  She denies having hallucinations but has been paranoid consistent with psychosis and that is the reason she is been given the Zyprexa.  She has been thinking that her food is poisoned but seems less paranoid today.  Therefore I would continue the current dose of Zyprexa 2.5 mg p.o. twice daily and I have changed the dosing to breakfast and supper.  Note psychiatry is following and if they thought she needs an increased dose of the Zyprexa I think she would tolerate it well.    I would not recommend further neurologic work-up.  We will follow-up as needed and note that psychiatry is following thanks      Akin Arora MD  04/04/22  12:45 EDT

## 2022-04-04 NOTE — NURSING NOTE
Patient has been awake and alert this shift.  Family has not been at bs this shift.  Patient has been up with PT/OT ambulated to recliner, up to BSC and accepted all po meds this shift.  Patient has been spontaneous in responses and appropriate, calm and cooperative with all medications and care this shift.  Now resting in bed will ctm.

## 2022-04-05 NOTE — THERAPY TREATMENT NOTE
Patient Name: Katie Douglas  : 1931    MRN: [id number]                              Today's Date: 2022       Admit Date: 3/26/2022    Visit Dx:     ICD-10-CM ICD-9-CM   1. Generalized weakness  R53.1 780.79   2. Decreased activities of daily living (ADL)  Z78.9 V49.89   3. Fall in home, initial encounter  W19.XXXA E888.9    Y92.009 E849.0   4. Acute UTI  N39.0 599.0   5. 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   General Information     Row Name 22 1348          Physical Therapy Time and Intention    Document Type therapy note (daily note)  -CB     Mode of Treatment individual therapy;physical therapy  -CB     Row Name 22 1348          General Information    Patient Profile Reviewed yes  -CB     Existing Precautions/Restrictions fall  -CB     Row Name 22 1348          Cognition    Orientation Status (Cognition) oriented to;person;place  -CB     Row Name 22 1348          Safety Issues, Functional Mobility    Safety Issues Affecting Function (Mobility) insight into deficits/self-awareness;awareness of need for assistance;judgment;problem-solving;safety precaution awareness;safety precautions follow-through/compliance  -CB     Impairments Affecting Function (Mobility) endurance/activity tolerance;strength;balance;cognition;postural/trunk control  -CB           User Key  (r) = Recorded By, (t) = Taken By, (c) = Cosigned By    Initials Name Provider Type    Ivonne Terrazas PT Physical Therapist                Mobility     Row Name 04/05/22 1348          Bed Mobility    Bed Mobility supine-sit;sit-supine  -CB     Scooting/Bridging Ritchie (Bed Mobility) maximum assist (25% patient effort);2 person assist;nonverbal cues (demo/gesture);verbal cues  -CB     Supine-Sit Ritchie (Bed Mobility) moderate assist (50% patient effort);verbal cues;nonverbal cues (demo/gesture)  -CB     Sit-Supine Ritchie (Bed Mobility) moderate assist (50% patient effort);verbal cues;nonverbal cues (demo/gesture)  -CB     Assistive Device (Bed Mobility) bed rails;head of bed elevated;leg   -CB     Row Name 04/05/22 1348          Transfers    Comment, (Transfers) pt more lethargic today during session therefore no tx or gait attempted today  -CB     Row Name 04/05/22 1348          Bed-Chair Transfer    Bed-Chair Ritchie (Transfers) not tested  -CB           User Key  (r) = Recorded By, (t) = Taken By, (c) = Cosigned By    Initials Name Provider Type    CB Ivonne Camargo, PT Physical Therapist               Obj/Interventions     Row Name 04/05/22 1352          Motor Skills    Therapeutic Exercise --  seated LAQ and marching x10 reps  -CB     Row Name 04/05/22 1352          Balance    Balance Assessment sitting static balance;sitting dynamic balance  -CB     Static Sitting Balance contact guard;verbal cues;non-verbal cues (demo/gesture)  -CB     Dynamic Sitting Balance contact guard;verbal cues;non-verbal cues (demo/gesture)  -CB     Position, Sitting Balance sitting edge of bed  -CB     Comment, Balance L lateral lean with cues to push with LUE into midline  -CB           User Key  (r) = Recorded By, (t) = Taken By, (c) = Cosigned By    Initials Name Provider Type    CB Ivonne Camargo, PT Physical Therapist               Goals/Plan    No documentation.                Clinical Impression     Row Name 04/05/22 1353          Pain    Additional Documentation Pain Scale: FACES Pre/Post-Treatment (Group)  -CB     Row Name 04/05/22  1354          Pain Scale: FACES Pre/Post-Treatment    Pain: FACES Scale, Pretreatment 4-->hurts little more  -CB     Posttreatment Pain Rating 4-->hurts little more  -CB     Pain Location - back  -CB     Row Name 04/05/22 9992          Plan of Care Review    Plan of Care Reviewed With patient  -CB     Progress no change  -CB     Outcome Evaluation Patient is agreeable to PT but increased lethargy today compared to previous session. She completed supine<>sitting requiring modA and sitting EOB with CGA. She did have L lateral lean in sitting with cues to push through LUE into midline position which she was able to complete with cues. She will continue to benefit from skilled PT and will progress as pt tolerates.  -CB     Row Name 04/05/22 7129          Positioning and Restraints    Pre-Treatment Position in bed  -CB     Post Treatment Position bed  -CB     In Bed notified nsg;fowlers;call light within reach;encouraged to call for assist;exit alarm on;legs elevated  -CB           User Key  (r) = Recorded By, (t) = Taken By, (c) = Cosigned By    Initials Name Provider Type    Ivonne Terrazas, PT Physical Therapist               Outcome Measures     Row Name 04/05/22 1420          How much help from another person do you currently need...    Turning from your back to your side while in flat bed without using bedrails? 3  -CB     Moving from lying on back to sitting on the side of a flat bed without bedrails? 2  -CB     Moving to and from a bed to a chair (including a wheelchair)? 2  -CB     Standing up from a chair using your arms (e.g., wheelchair, bedside chair)? 2  -CB     Climbing 3-5 steps with a railing? 1  -CB     To walk in hospital room? 2  -CB     AM-PAC 6 Clicks Score (PT) 12  -CB     Row Name 04/05/22 1420          Functional Assessment    Outcome Measure Options AM-PAC 6 Clicks Basic Mobility (PT)  -CB           User Key  (r) = Recorded By, (t) = Taken By, (c) = Cosigned By    Initials Name Provider Type     CB Ivonne Camargo, PT Physical Therapist                             Physical Therapy Education                 Title: PT OT SLP Therapies (In Progress)     Topic: Physical Therapy (In Progress)     Point: Mobility training (Done)     Learning Progress Summary           Patient Acceptance, E,TB,D, VU,NR by CB at 4/5/2022 1420   Family Acceptance, E, VU by  at 4/2/2022 1653      Show all documentation for this point (10)                 Point: Home exercise program (Done)     Learning Progress Summary           Patient Acceptance, E,TB,D, VU,NR by CB at 4/5/2022 1420      Show all documentation for this point (6)                 Point: Body mechanics (Done)     Learning Progress Summary           Patient Acceptance, E,TB,D, VU,NR by CB at 4/5/2022 1420   Family Acceptance, E, VU by  at 4/2/2022 1653      Show all documentation for this point (10)                 Point: Precautions (In Progress)     Learning Progress Summary           Patient Acceptance, E,TB,D, NR by CB at 4/4/2022 1328   Family Acceptance, E,D, NR by PC at 3/30/2022 1049      Show all documentation for this point (7)                             User Key     Initials Effective Dates Name Provider Type Discipline    PC 06/16/21 -  Renu Engle PT Physical Therapist PT    CB 10/22/21 -  Ivonne Camargo, JULIENNE Physical Therapist PT     11/03/21 -  Pamela Diaz PT Physical Therapist PT              PT Recommendation and Plan     Plan of Care Reviewed With: patient  Progress: no change  Outcome Evaluation: Patient is agreeable to PT but increased lethargy today compared to previous session. She completed supine<>sitting requiring modA and sitting EOB with CGA. She did have L lateral lean in sitting with cues to push through LUE into midline position which she was able to complete with cues. She will continue to benefit from skilled PT and will progress as pt tolerates.     Time Calculation:    PT Charges     Row Name 04/05/22 2655              Time Calculation    Start Time 1333  -CB      Stop Time 1344  -CB      Time Calculation (min) 11 min  -CB      PT Received On 04/05/22  -CB      PT - Next Appointment 04/06/22  -CB              Time Calculation- PT    Total Timed Code Minutes- PT 11 minute(s)  -CB              Timed Charges    75260 - PT Therapeutic Activity Minutes 11  -CB              Total Minutes    Timed Charges Total Minutes 11  -CB       Total Minutes 11  -CB            User Key  (r) = Recorded By, (t) = Taken By, (c) = Cosigned By    Initials Name Provider Type    CB Ivonne Camargo, PT Physical Therapist              Therapy Charges for Today     Code Description Service Date Service Provider Modifiers Qty    15533682996 HC PT THERAPEUTIC ACT EA 15 MIN 4/4/2022 Ivonne Camargo, PT GP 1    27126566690 HC PT THERAPEUTIC ACT EA 15 MIN 4/5/2022 Ivonne Camargo, PT GP 1          PT G-Codes  Outcome Measure Options: AM-PAC 6 Clicks Basic Mobility (PT)  AM-PAC 6 Clicks Score (PT): 12  AM-PAC 6 Clicks Score (OT): 12  Modified Kenneth Scale: 4 - Moderately severe disability.  Unable to walk without assistance, and unable to attend to own bodily needs without assistance.    Ivonne Camargo PT  4/5/2022

## 2022-04-05 NOTE — PROGRESS NOTES
"    DAILY PROGRESS NOTE  [facility]    Patient Identification:  Name: Katie Douglas  Age: 89 y.o.  Sex: female  :  1931  MRN: [id number]         Primary Care Physician: Sandrita Godinez MD    Subjective:  Interval History: Not voicing anything new.  She does feel that she is more relaxed and does not feel as agitated.  I had RN present with me at bedside as no family are present this morning.  Patient does not feel as accusation only she has been in the last couple days.  She is not actively hallucinating at this time    Objective: Seems much more calm and closer to baseline.    Scheduled Meds:atorvastatin, 40 mg, Oral, Daily  famotidine, 20 mg, Oral, BID AC  felodipine, 10 mg, Oral, Daily  levothyroxine, 100 mcg, Oral, Q AM  OLANZapine, 2.5 mg, Oral, Daily With Breakfast & Dinner  propranolol LA, 80 mg, Oral, Daily  sertraline, 100 mg, Oral, Daily  sodium chloride, 10 mL, Intravenous, Q12H      Continuous Infusions:     Vital signs in last 24 hours:  Temp:  [97.9 °F (36.6 °C)-98.2 °F (36.8 °C)] 98 °F (36.7 °C)  Heart Rate:  [67-77] 68  Resp:  [18] 18  BP: (120-155)/(73-88) 155/83    Intake/Output:  No intake or output data in the 24 hours ending 22 0958    Exam:  /83 (BP Location: Right arm, Patient Position: Lying)   Pulse 68   Temp 98 °F (36.7 °C) (Oral)   Resp 18   Ht 157.5 cm (62.01\")   Wt 60.2 kg (132 lb 11.5 oz)   SpO2 95% Comment: 95  BMI 24.27 kg/m²     General Appearance:    Alert, cooperative, no witnessed agitation or paranoia                         Throat:   Oral mucosa pink and moist                         Lungs:    Decreased otherwise clear to auscultation bilaterally, respirations unlabored                         Heart:    Regular rate and rhythm, S1 and S2 normal                  Abdomen:     Soft, nontender, bowel sounds active                  Extremities: Generalized weakness though moving all, no cyanosis or edema                  Neurologic:   " CNII-XII intact     Data Review:  Labs in chart were reviewed.    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  Patient seems much less agitated and does not seem nearly as paranoid.    Psychiatry dose Zyprexa    Also appreciate neurology input -no new work-up or med changes noted    Levothyroxine decreased    HTN stable with mood    Dysphagia with abnormal esophagram -GI consulted and signed off stating patient does not want EGD    Replaced K      Disposition -CCP helping to coordinate.  If patient continues to stabilized and anticipate she could be ready within the next day or 2   -PT notes 10 feet -SNF will likely be the endorsed medical care post DC    Konstantin Parmar MD  4/5/2022  09:58 EDT

## 2022-04-05 NOTE — PLAN OF CARE
Goal Outcome Evaluation:  Plan of Care Reviewed With: patient        Progress: no change  Outcome Evaluation: Patient is agreeable to PT but increased lethargy today compared to previous session. She completed supine<>sitting requiring modA and sitting EOB with CGA. She did have L lateral lean in sitting with cues to push through LUE into midline position which she was able to complete with cues. She will continue to benefit from skilled PT and will progress as pt tolerates.

## 2022-04-05 NOTE — CASE MANAGEMENT/SOCIAL WORK
Continued Stay Note  [facility]     Patient Name: Katie Douglas  MRN: [id number]  Today's Date: 4/5/2022    Admit Date: 3/26/2022     Discharge Plan     Row Name 04/05/22 1708       Plan    Plan [facility] vs [facility]-- awaiting family decision    Patient/Family in Agreement with Plan yes    Plan Comments Reviewed with MD, plan for dc tomorrow. Spoke with [facility] no availablity for snf beds at earliest 4/8. CCP spoke with [facility], pt accepted and bed available tomorrow pending dtr signs admission paper work. [facility] states if pt does get a booster covid vaccine prior to dc she will not have to be in 14 day quarantine. CCP also spoke with Lindsay/[facility] and they have accepted pt and can admit tomorrow. CCP called [facility] and left message for Dr. Ortiz with [facility]. CCP called dtr Catherine and left vm, will follow up in am for disposition decision. Partial Packet in CCP office. Ailyn CHRISTIANSON/CCP               Discharge Codes    No documentation.               Expected Discharge Date and Time     Expected Discharge Date Expected Discharge Time    Apr 6, 2022             Lidia Pacheco RN

## 2022-04-05 NOTE — PLAN OF CARE
Goal Outcome Evaluation:           Progress: improving   VSS.  A-Ox3 with forgetfulness and occasional confusion.  Pt more drowsy today but behavior appropriate when awake.  Will CTM.

## 2022-04-05 NOTE — PROGRESS NOTES
The patient is sleeping soundly today and cannot be awakened for interview.  No family is at bedside.  Dr. Parmar's note seems to indicate that the patient's paranoia and psychotic symptoms are improving.  Continuing to follow.

## 2022-04-06 NOTE — PROGRESS NOTES
[facility] Clinical Pharmacy Services: Pharmacy Consult for COVID-19 Vaccine    Katie Douglas is a 89 y.o. female for whom Pharmacy has been consulted to consent for inpatient COVID-19 vaccine (Pfizer).    Consulting provider: Dr. Parmar  Date consult ordered: 4/6/2022    Previous COVID-19 Vaccination History (includes dates and ):  1st dose: 11/22/2021 (Pfizer, [id number])  2nd dose: 12/21/2021 (Pfizer, [id number])    Planned dose in series (1st, 2nd, 3rd, 1st booster, 2nd booster): 1st booster dose due on 5/21/2022    Immunization history have been reviewed. Patient does not meet criteria to receive 1st booster dose at the time of discharge today.   1st booster dose is recommended after at least 5 months following 2nd dose of mRNA vaccine (Pfizer or Moderna). Patient will be due to receive 1st booster dose on or after 5/21/2022.     Thank you for this consult. Please reach out to pharmacy with any questions.    Paradise Winston, PharmD  Clinical Pharmacist

## 2022-04-06 NOTE — PROGRESS NOTES
Call from Kathrine  regarding change in placement.  Consulted with Dr. Ortiz who had no objections to the change.

## 2022-04-06 NOTE — CASE MANAGEMENT/SOCIAL WORK
Continued Stay Note  [facility]     Patient Name: Katie Douglas  MRN: [id number]  Today's Date: 4/6/2022    Admit Date: 3/26/2022     Discharge Plan     Row Name 04/06/22 1644       Plan    Plan [facility]    Plan Comments 4/6/22 1149...CCP spoke with pts daughter, she requests for CCP to check [facility] bed availablity. CCP spoke with [facility] continues no bed availablity. CCP called dtr and notified her of bed availablity. Dtr states plan is for [facility] [place]. She asks about COVID Booster. CCP spoke with MD and order in [place]. Packet in [facility]. Ailyn CHRISTIANSON/AMBIKA               Discharge Codes    No documentation.               Expected Discharge Date and Time     Expected Discharge Date Expected Discharge Time    Apr 6, 2022             Lidia Pacheco RN

## 2022-04-06 NOTE — PLAN OF CARE
Goal Outcome Evaluation:  Plan of Care Reviewed With: patient        Progress: improving  Outcome Evaluation: Pt confused/agitated during the night stating she was leaving. Reorientated. Plan for d/c rehab today. Will CTM

## 2022-04-06 NOTE — CASE MANAGEMENT/SOCIAL WORK
Continued Stay Note  [facility]     Patient Name: Katie Douglas  MRN: [id number]  Today's Date: 4/6/2022    Admit Date: 3/26/2022     Discharge Plan     Row Name 04/06/22 1644       Plan    Plan [facility] via [facility] 4[facility]    Patient/Family in Agreement with Plan yes    Plan Comments CCP spoke with [facility], explained spoke with [facility] and [facility]tr, [facility]. She verbalized understanding. CCP called [facility] ems no availablity. CCP called [facility] stretcher transport for 4pm today reservation# [id number]. Notified [facility] of time and that pt not eligable for COVID Booster, pharmacy note per chart. She notified [facility]. Packet given to MEGHAN Robertson RN/CCP               Discharge Codes    No documentation.               Expected Discharge Date and Time     Expected Discharge Date Expected Discharge Time    Apr 6, 2022             Lidia Pacheco RN

## 2022-04-06 NOTE — DISCHARGE SUMMARY
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]  Unknown   • Generalized weakness [R53.1]  Yes   • Immobility [Z74.09]  Yes   • Bacteria in urine [R82.71]  Yes   • Dysphagia [R13.10]  Unknown   • Abnormal esophagram [R93.3]  Unknown   • Impairment of balance [R26.89]  Yes   • Hypertension [I10]  Yes   • Hyperlipidemia [E78.5]  Yes   • Hypothyroidism [E03.9]  Yes      Resolved Hospital Problems    Diagnosis Date Resolved POA   • Metabolic encephalopathy [G93.41] 04/03/2022 Yes     Procedure(s):  ESOPHAGOGASTRODUODENOSCOPY with possible esophageal dilation     Consults     Date and Time Order Name Status Description    4/3/2022 11:33 AM Inpatient Neurology Consult General Completed     3/30/2022  6:36 PM Inpatient Cardiology Consult Completed     3/30/2022  4:36 PM Inpatient Gastroenterology Consult Completed     3/30/2022 11:43 AM Inpatient Psychiatrist Consult Completed     3/28/2022  2:30 PM Inpatient Neurology Consult General Completed     3/26/2022  8:06 PM [facility] (on-call MD unless specified) Details          Hospital Course  89 y.o. female was initially admitted for generalized weakness as well as encephalopathy felt secondary to a UTI at admission.  Patient was seen in consultation by cardiology, GI, psychiatry, and neurology.  To summarize this hospital course, when I assumed care of patient as of 4/3/2022, I did not feel that UTI was present and I did not feel that metabolic encephalopathy and confusion and aspects of paranoia as well as agitation could further be deemed to UTI because she pretty much completed antibiotic treatment but had actual worsening of her symptoms.  I think this patient has underlying dementia and she is developed aspects of agitation and  paranoia.  Psychiatry saw in consultation and of titrated Zyprexa to 2.5 mg twice daily.  We have not found any other additional metabolic or organic causes that could be attributed.  I asked neurology to also see during this hospitalization to ensure that no additional work-up is warranted and they did not endorse any further work-up.  Over the last couple days this patient is finally started to develop some clinical progression.  She is improving daily in regards to her affect and interactions.  I have not seen aspects of agitation or paranoia over the last couple days.  I had discussed with family earlier in the hospitalization and otherwise discussed daily multidisciplinary rounds.  She was found to have some aspects of dysphagia with abnormal esophagram though patient apparently declined further evaluation with GI.  She is otherwise tolerating diet.  Her hypertension is labile and this is secondary to labile mood.  I would not make any further changes to her regimen as just over the last 12 hours you could notice some pressures as good as 120/73 and you can see others as high as 166/102.  Her propranolol which is utilized for tremor has actually been reduced.  She otherwise is maintained on her normal home dose of Plendil.  By discharge her levothyroxine will be increased to 112 mcg and PCP can follow-up on rechecking TSH in the next 4 to 6 weeks as it was slightly subtherapeutic.  This patient lives at home independently and I think she is reaching a point where safety is becoming an issue.  [facility] is coordinating discharge needs at an outlying facility and patient is medically stable to transition there once all logistics are finalized.      Condition on Discharge: Improved.     Temp:  [97.9 °F (36.6 °C)-98.9 °F (37.2 °C)] 98.6 °F (37 °C)  Heart Rate:  [66-80] 73  Resp:  [18] 18  BP: (141-175)/() 166/102  Body mass index is 24.27 kg/m².    Physical Exam  HENT:      Head: Normocephalic.      Nose: Nose  normal.      Mouth/Throat:      Mouth: Mucous membranes are moist.      Pharynx: Oropharynx is clear.   Eyes:      Extraocular Movements: Extraocular movements intact.      Conjunctiva/sclera: Conjunctivae normal.      Pupils: Pupils are equal, round, and reactive to light.   Cardiovascular:      Rate and Rhythm: Normal rate and regular rhythm.   Pulmonary:      Effort: Pulmonary effort is normal. No respiratory distress.      Breath sounds: Normal breath sounds.   Abdominal:      General: Bowel sounds are normal. There is no distension.      Palpations: Abdomen is soft.      Tenderness: There is no abdominal tenderness.   Musculoskeletal:         General: No swelling.   Skin:     Coloration: Skin is not jaundiced.   Neurological:      Mental Status: She is alert and oriented to person, place, and time.      Comments: Fluent speech   Psychiatric:      Comments: Smiling and much more appropriate approaching baseline.  Does not seem agitated and I cannot appreciate any any significant paranoia       Disposition: Skilled Nursing Facility (DC - External)       Discharge Medications      New Medications      Instructions Start Date   OLANZapine 2.5 MG tablet  Commonly known as: zyPREXA   2.5 mg, Oral, Daily With Breakfast & Dinner         Changes to Medications      Instructions Start Date   levothyroxine 112 MCG tablet  Commonly known as: SYNTHROID, LEVOTHROID  What changed:   · medication strength  · how much to take  · when to take this   112 mcg, Oral, Every Early Morning         Continue These Medications      Instructions Start Date   atorvastatin 40 MG tablet  Commonly known as: LIPITOR   40 mg, Oral, Daily      felodipine 10 MG 24 hr tablet  Commonly known as: PLENDIL   Take 1 tablet by mouth once daily      propranolol LA 80 MG 24 hr capsule  Commonly known as: INDERAL LA   80 mg, Oral, Daily   Start Date: April 7, 2022     sertraline 100 MG tablet  Commonly known as: ZOLOFT   Take 1 tablet by mouth once daily          Stop These Medications    piroxicam 20 MG capsule  Commonly known as: FELDENE            Activity Instructions     Activity as Tolerated           Additional Instructions for the Follow-ups that You Need to Schedule     Discharge Follow-up with PCP   As directed       Currently Documented PCP:    Sandrita Godinez MD    PCP Phone Number:    [phone]     Follow Up Details: pcp post rehab -GI/neuro/psychiatry as needed            Follow-up Information     Sandrita Godinez MD .    Specialty: Internal Medicine  Why: Primary MD.  1 week.  Encephalopathy.  UTI.  Hypothyroidism.  Hypertension.  Contact information:  [address] [place] [place] [id number] [phone]             Sandrita Godinez MD .    Specialty: Internal Medicine  Why: pcp post rehab -GI/neuro/psychiatry as needed  Contact information:  [address] [place] [place] [id number] [phone]                           Konstantin Parmar MD  04/06/22  12:05 EDT    Discharge time spent greater than 30 minutes.

## 2022-04-06 NOTE — PLAN OF CARE
Goal Outcome Evaluation:  Plan of Care Reviewed With: patient         Patient is ready for discharge and family is at bedside. She is pleasantly confused at times but is cooperative and compliant with all directions and care.   Problem: Fall Injury Risk  Goal: Absence of Fall and Fall-Related Injury  Outcome: Adequate for Care Transition  Intervention: Identify and Manage Contributors  Recent Flowsheet Documentation  Taken 4/6/2022 1145 by Elizabeth Garcia RN  Medication Review/Management: medications reviewed  Taken 4/6/2022 1033 by Elizabeth Garcia RN  Medication Review/Management: medications reviewed  Taken 4/6/2022 0934 by Elizabeth Garcia RN  Medication Review/Management: medications reviewed  Taken 4/6/2022 0715 by Elizabeth Garcia RN  Medication Review/Management: medications reviewed  Intervention: Promote Injury-Free Environment  Recent Flowsheet Documentation  Taken 4/6/2022 1328 by Elizabeth Garcia RN  Safety Promotion/Fall Prevention:   activity supervised   safety round/check completed   toileting scheduled   room organization consistent   nonskid shoes/slippers when out of bed   lighting adjusted   fall prevention program maintained   clutter free environment maintained  Taken 4/6/2022 1145 by Elizabeth Garcia RN  Safety Promotion/Fall Prevention:   activity supervised   toileting scheduled   safety round/check completed   room organization consistent   fall prevention program maintained   clutter free environment maintained  Taken 4/6/2022 1033 by Elizabeth Garcia RN  Safety Promotion/Fall Prevention:   toileting scheduled   safety round/check completed   room organization consistent   nonskid shoes/slippers when out of bed   lighting adjusted   fall prevention program maintained   clutter free environment maintained  Taken 4/6/2022 0934 by Elizabeth Garcia RN  Safety Promotion/Fall Prevention:   toileting scheduled   safety round/check completed   room organization consistent   nonskid shoes/slippers when out of bed   lighting  adjusted   fall prevention program maintained   clutter free environment maintained   activity supervised  Taken 4/6/2022 0840 by Elizabeth Garcia RN  Safety Promotion/Fall Prevention:   activity supervised   toileting scheduled   safety round/check completed   room organization consistent   nonskid shoes/slippers when out of bed   lighting adjusted   fall prevention program maintained   clutter free environment maintained  Taken 4/6/2022 0715 by Elizabeth Garcia RN  Safety Promotion/Fall Prevention:   activity supervised   toileting scheduled   safety round/check completed   room organization consistent   nonskid shoes/slippers when out of bed   fall prevention program maintained   clutter free environment maintained   lighting adjusted     Problem: Adult Inpatient Plan of Care  Goal: Plan of Care Review  Outcome: Adequate for Care Transition  Goal: Patient-Specific Goal (Individualized)  Outcome: Adequate for Care Transition  Goal: Absence of Hospital-Acquired Illness or Injury  Outcome: Adequate for Care Transition  Intervention: Identify and Manage Fall Risk  Recent Flowsheet Documentation  Taken 4/6/2022 1328 by Elizabeth Garcia RN  Safety Promotion/Fall Prevention:   activity supervised   safety round/check completed   toileting scheduled   room organization consistent   nonskid shoes/slippers when out of bed   lighting adjusted   fall prevention program maintained   clutter free environment maintained  Taken 4/6/2022 1145 by Elizabeth Garcia RN  Safety Promotion/Fall Prevention:   activity supervised   toileting scheduled   safety round/check completed   room organization consistent   fall prevention program maintained   clutter free environment maintained  Taken 4/6/2022 1033 by Elizabeth Garcia RN  Safety Promotion/Fall Prevention:   toileting scheduled   safety round/check completed   room organization consistent   nonskid shoes/slippers when out of bed   lighting adjusted   fall prevention program maintained   clutter free  environment maintained  Taken 4/6/2022 0934 by Elizabeth Garcia RN  Safety Promotion/Fall Prevention:   toileting scheduled   safety round/check completed   room organization consistent   nonskid shoes/slippers when out of bed   lighting adjusted   fall prevention program maintained   clutter free environment maintained   activity supervised  Taken 4/6/2022 0840 by Elizabeth Garcia RN  Safety Promotion/Fall Prevention:   activity supervised   toileting scheduled   safety round/check completed   room organization consistent   nonskid shoes/slippers when out of bed   lighting adjusted   fall prevention program maintained   clutter free environment maintained  Taken 4/6/2022 0715 by Elizabeth Garcia RN  Safety Promotion/Fall Prevention:   activity supervised   toileting scheduled   safety round/check completed   room organization consistent   nonskid shoes/slippers when out of bed   fall prevention program maintained   clutter free environment maintained   lighting adjusted  Intervention: Prevent Skin Injury  Recent Flowsheet Documentation  Taken 4/6/2022 1328 by Elizabeth Garcia RN  Body Position:   turned   left  Taken 4/6/2022 1145 by Elizabeth Garcia RN  Body Position: sitting up in bed  Taken 4/6/2022 1033 by Elizabeth Garcia RN  Body Position: supine  Taken 4/6/2022 0934 by Elizabeth Garcia RN  Body Position: left  Skin Protection: incontinence pads utilized  Taken 4/6/2022 0840 by Elizabeth Garcia RN  Body Position: left  Taken 4/6/2022 0715 by Elizabeth Garcia RN  Body Position:   left   side-lying  Intervention: Prevent and Manage VTE (Venous Thromboembolism) Risk  Recent Flowsheet Documentation  Taken 4/6/2022 1328 by Elizabeth Garcia RN  Activity Management: activity adjusted per tolerance  Taken 4/6/2022 1145 by Elizabeth Garcia RN  Activity Management: activity adjusted per tolerance  Taken 4/6/2022 1033 by Elizabeth Garcia RN  Activity Management: activity adjusted per tolerance  Taken 4/6/2022 0934 by Elizabeth Garcia RN  Activity Management: activity  adjusted per tolerance  VTE Prevention/Management: patient refused intervention  Range of Motion: active ROM (range of motion) encouraged  Taken 4/6/2022 0840 by Elizabeth Garcia RN  Activity Management: activity encouraged  Taken 4/6/2022 0715 by Elizabeth Garcia RN  Activity Management: activity adjusted per tolerance  Intervention: Prevent Infection  Recent Flowsheet Documentation  Taken 4/6/2022 1328 by Elizabeth Garcia RN  Infection Prevention:   environmental surveillance performed   hand hygiene promoted   single patient room provided  Taken 4/6/2022 1145 by Elizabeth Garcia RN  Infection Prevention:   environmental surveillance performed   hand hygiene promoted  Taken 4/6/2022 1033 by Elizabeth Garcia RN  Infection Prevention:   environmental surveillance performed   hand hygiene promoted   single patient room provided  Taken 4/6/2022 0934 by Elizabeth Garcia RN  Infection Prevention:   single patient room provided   hand hygiene promoted   environmental surveillance performed  Taken 4/6/2022 0840 by Elizabeth Garcia RN  Infection Prevention:   single patient room provided   hand hygiene promoted   environmental surveillance performed  Taken 4/6/2022 0715 by Elizabeth Garcia RN  Infection Prevention:   environmental surveillance performed   hand hygiene promoted   single patient room provided  Goal: Optimal Comfort and Wellbeing  Outcome: Adequate for Care Transition  Intervention: Provide Person-Centered Care  Recent Flowsheet Documentation  Taken 4/6/2022 1328 by Elizabeth Garcia RN  Trust Relationship/Rapport:   care explained   choices provided   emotional support provided   empathic listening provided   questions answered   questions encouraged   reassurance provided   thoughts/feelings acknowledged  Goal: Readiness for Transition of Care  Outcome: Adequate for Care Transition     Problem: Hypertension Comorbidity  Goal: Blood Pressure in Desired Range  Outcome: Adequate for Care Transition  Intervention: Maintain Blood Pressure  Management  Recent Flowsheet Documentation  Taken 4/6/2022 1145 by Elizabeth Garcia RN  Medication Review/Management: medications reviewed  Taken 4/6/2022 1033 by Elizabeth Garcia RN  Medication Review/Management: medications reviewed  Taken 4/6/2022 0934 by Elizabeth Garcia RN  Syncope Management: position changed slowly  Medication Review/Management: medications reviewed  Taken 4/6/2022 0715 by Elizabeth Garcia RN  Medication Review/Management: medications reviewed     Problem: Osteoarthritis Comorbidity  Goal: Maintenance of Osteoarthritis Symptom Control  Outcome: Adequate for Care Transition  Intervention: Maintain Osteoarthritis Symptom Control  Recent Flowsheet Documentation  Taken 4/6/2022 1328 by Elizabeth Garcia RN  Activity Management: activity adjusted per tolerance  Taken 4/6/2022 1145 by Elizabeth Garcia RN  Activity Management: activity adjusted per tolerance  Medication Review/Management: medications reviewed  Taken 4/6/2022 1033 by Elizabeth Garcia RN  Activity Management: activity adjusted per tolerance  Medication Review/Management: medications reviewed  Taken 4/6/2022 0934 by Elizabeth Garcia RN  Activity Management: activity adjusted per tolerance  Medication Review/Management: medications reviewed  Taken 4/6/2022 0840 by Elizabeth Garcia RN  Activity Management: activity encouraged  Taken 4/6/2022 0715 by Elizabeth Garcia RN  Activity Management: activity adjusted per tolerance  Medication Review/Management: medications reviewed     Problem: Skin Injury Risk Increased  Goal: Skin Health and Integrity  Outcome: Adequate for Care Transition  Intervention: Optimize Skin Protection  Recent Flowsheet Documentation  Taken 4/6/2022 1328 by Elizabeth Garcia RN  Head of Bed (HOB) Positioning: HOB at 45 degrees  Taken 4/6/2022 1145 by Elizabeth Garcia RN  Head of Bed (HOB) Positioning: HOB at 90 degrees  Taken 4/6/2022 1033 by Elizabeth Garcia RN  Head of Bed (HOB) Positioning: HOB at 20 degrees  Taken 4/6/2022 0934 by Elizabeth Garcia RN  Pressure Reduction  Techniques: weight shift assistance provided  Head of Bed (HOB) Positioning: HOB at 20 degrees  Pressure Reduction Devices: alternating pressure pump (ADD)  Skin Protection: incontinence pads utilized  Taken 4/6/2022 0840 by Elizabeth Garcia RN  Head of Bed (HOB) Positioning: HOB at 20-30 degrees  Taken 4/6/2022 0715 by Elizabeth Garcia RN  Head of Bed (HOB) Positioning: HOB at 20 degrees     Problem: Restraint, Nonbehavioral (Nonviolent)  Goal: Absence of Harm or Injury  Outcome: Adequate for Care Transition  Intervention: Implement Least Restrictive Safety Strategies  Recent Flowsheet Documentation  Taken 4/6/2022 1328 by Elizabeth Garcia RN  Medical Device Protection: IV pole/bag removed from visual field  Taken 4/6/2022 1145 by Elizabeth Garcia RN  Medical Device Protection: IV pole/bag removed from visual field  Taken 4/6/2022 1033 by Elizabeth Garcia RN  Medical Device Protection: IV pole/bag removed from visual field  Taken 4/6/2022 0934 by Elizabeth Garcia RN  Medical Device Protection: IV pole/bag removed from visual field  Taken 4/6/2022 0840 by Elizabeth Garcia RN  Medical Device Protection: IV pole/bag removed from visual field  Taken 4/6/2022 0715 by Elizabeth Garcia RN  Medical Device Protection: long sleeve gown  Intervention: Protect Dignity, Rights, and Personal Wellbeing  Recent Flowsheet Documentation  Taken 4/6/2022 1328 by Elizabeth Garcia RN  Trust Relationship/Rapport:   care explained   choices provided   emotional support provided   empathic listening provided   questions answered   questions encouraged   reassurance provided   thoughts/feelings acknowledged  Intervention: Protect Skin and Joint Integrity  Recent Flowsheet Documentation  Taken 4/6/2022 1328 by Elizabeth Garcia RN  Body Position:   turned   left  Taken 4/6/2022 1145 by Elizabeth Garcia RN  Body Position: sitting up in bed  Taken 4/6/2022 1033 by Elizabeth Garcia RN  Body Position: supine  Taken 4/6/2022 0934 by Elizabeth Garcia RN  Body Position: left  Range of Motion: active  ROM (range of motion) encouraged  Taken 4/6/2022 0840 by Elizabeth Garcia RN  Body Position: left  Taken 4/6/2022 0715 by Elizabeth Garcia RN  Body Position:   left   side-lying

## 2022-04-11 NOTE — ED PROVIDER NOTES
EMERGENCY DEPARTMENT ENCOUNTER    Room Number:  03/03  Date seen:  4/11/2022  Time seen: 01:00 EDT  PCP: Divina Nassar MD  Historian: patient, daughter, EMS    HPI:  Chief complaint:  A complete HPI/ROS/PMH/PSH/SH/FH are unobtainable due to:   Context:Katie Douglas is a 89 y.o. female recently admitted to rehab center to regain strength.  She has PMH of generalized weakness, hypertension, hyperlipidemia balance problems who presents to the ED with c/o taking a tumble and falling hitting her head on a bed frame tonight at her rehab facility.  She typically walks with a walker but does not have 1 yet in her room.  She denies any preceding symptoms of lightheadedness or dizziness.  She does remember the event.  She is not currently on blood thinners.  She is here with her daughter who reports that she is at baseline.  She denies any associated injuries.    Patient was placed in face mask in first look. Patient was wearing facemask when I entered the room and throughout our encounter. I wore full protective equipment throughout this patient encounter including a N95 face mask, eye shield and gloves. Hand hygiene/washing of hands was performed before donning protective equipment and after removal when leaving the room.    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    FAMILY HISTORY  No family history on file.    SOCIAL HISTORY  Social History     Socioeconomic History   • Marital status:    Tobacco Use   • Smoking status: Never Smoker   • Smokeless tobacco: Never Used   Vaping Use   • Vaping Use: Never used       REVIEW OF SYSTEMS  Review of Systems    All systems reviewed and negative except for those discussed in HPI.     PHYSICAL EXAM    ED Triage Vitals [04/11/22 0040]   Temp Heart Rate Resp BP SpO2   98.3 °F (36.8 °C) 67 14 (!) 189/88 96 %      Temp src Heart Rate Source Patient Position BP Location FiO2 (%)   -- -- -- -- --     Physical Exam    I have reviewed the triage vital signs and nursing notes.      GENERAL: not distressed  HENT: nares patent, mm moist, no facial droop  EYES: no scleral icterus, PERRL  NECK: no ROM limitations  CV: regular rhythm, regular rate, no murmur, no rubs, no gallups  RESPIRATORY: normal effort, CTAB  ABDOMEN: soft  : deferred  MUSCULOSKELETAL: no deformity to bilateral hands or wrists.  There is no shoulder discomfort or proximal humerus pain.    NEURO: alert, moves all extremities, follows commands  SKIN: warm, dry      RADIOLOGY RESULTS  CT Head Without Contrast    Result Date: 4/11/2022  Patient: ENZO MCNALLY  Time Out: 01:49 Exam(s): CT HEAD Without Contrast EXAM:   CT Head Without Intravenous Contrast CLINICAL HISTORY:  fall. TECHNIQUE:   Axial computed tomography images of the head brain without intravenous contrast.  CTDI is 54.8 mGy and DLP is 1056.6 mGy-cm.  This CT exam was performed according to the principle of ALARA (As Low As Reasonably Achievable) by using one or more of the following dose reduction techniques: automated exposure control, adjustment of the mA and or kV according to patient size, and or use of iterative reconstruction technique.  Coronal and sagittal reconstructions are performed. 322 images COMPARISON:   03 26 22  FINDINGS:   Brain:  Moderate to severe age-related generalized brain volume loss and chronic small vessel ischemic changes.  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  No hemorrhage.   Ventricles:  Unremarkable.  No ventriculomegaly.   Bones joints:  Unremarkable.  No acute fracture.   Soft tissues:  Small to moderate left forehead scalp hematoma.   Sinuses:  Unremarkable as visualized.  No acute sinusitis.   Mastoid air cells:  Unremarkable as visualized.  No mastoid effusion.   Orbits:  Bilateral cataract surgeries. IMPRESSION:       No intracranial hemorrhage or skull fracture.     Electronically signed by Antione Jacob M.D. on 04-11-22 at 0149    CT Cervical Spine Without Contrast    Result Date: 4/11/2022  Patient: ENZO MCNALLY  Time Out: 01:55 Exam(s): CT C SPINE EXAM:   CT Cervical Spine Without Intravenous Contrast CLINICAL HISTORY:   fall, neck pain. TECHNIQUE:   Axial computed tomography images of the cervical spine without intravenous contrast.  CTDI is 11.4 mGy and DLP is 199.2 mGy-cm.  This CT exam was performed according to the principle of ALARA (As Low As Reasonably Achievable) by using one or more of the following dose reduction techniques: automated exposure control, adjustment of the mA and or kV according to patient size, and or use of iterative reconstruction technique.   Coronal and sagittal reformatted images were created and reviewed. COMPARISON:   03 26 22 FINDINGS:   Vertebrae:  2-3 mm anterolisthesis of C7 on T1.  No acute findings.   Discs spinal canal neural foramina:  Osteopenia.  Moderate degenerative disc disease.    Soft tissues:  Unremarkable. IMPRESSION:       No acute findings or substantial change     Electronically signed by Antione Jacob M.D. on 04-11-22 at 0155         PROGRESS, DATA ANALYSIS, CONSULTS AND MEDICAL DECISION MAKING  All labs have been independently reviewed by me.  All radiology studies have been reviewed by me and discussed with  "radiologist dictating the report.  EKG's independently viewed and interpreted by me unless stated otherwise. Discussion below represents my analysis of pertinent findings related to patient's condition, differential diagnosis, treatment plan and final disposition.     ED Course as of 04/11/22 0256   Mon Apr 11, 2022   0208 CT head and cervical spine are not acute. Pt has no acute neurological abnormalities.  I will discharge her home.  Daughter agrees to transport her back to Rehab.  [EW]      ED Course User Index  [EW] Stephanie Henriquez, APRN     DDX:  Fall at rehab facility, [facility], forehead contusion, cervical spine injury    Reviewed pt's history and workup with Dr. Salgado.  After a bedside evaluation, Dr. Salgado agrees with the plan of care.    The patient's history, physical exam, and lab findings were discussed with the physician, who also performed a face to face history and physical exam.  I discussed all results and noted any abnormalities with patient.  Discussed absoute need to recheck abnormalities with their family physician.  I answered any of the patient's questions.  Discussed plan for discharge, as there is no emergent indication for admission.  Pt is agreeable and understands need for follow up and repeat testing.  Pt is aware that discharge does not mean that nothing is wrong but it indicates no emergency is present and they must continue care with their family physician.  Pt is discharged with instructions to follow up with primary care doctor to have their blood pressure rechecked.         Disposition vitals:  BP (!) 189/88   Pulse 67   Temp 98.3 °F (36.8 °C)   Resp 14   Ht 157.5 cm (62\")   Wt 59 kg (130 lb)   SpO2 96%   BMI 23.78 kg/m²       DIAGNOSIS  Final diagnoses:   Fall at nursing home, initial encounter   Forehead contusion, initial encounter   Balance problems       FOLLOW UP   Divina Nassar MD  [address] [facility] [address] [phone]    Schedule an " appointment as soon as possible for a visit in 1 week  As needed         Stephanie Henriquez, APRN  04/11/22 0257

## 2022-04-11 NOTE — ED TRIAGE NOTES
"Patient here via EMS from [facility] for an unwitnessed fall. Per staff, patient was \"not on floor for long\". Bump noted to forehead above L eye. H/o dementia. Currently at [facility] per staff and daughter. Denies blood thinner.     Patient recently d/c'd 4/6 for metabolic encephalopathy.    Patient wearing mask during triage. RN wearing appropriate PPE during triage. Hand hygiene performed.  "

## 2022-04-11 NOTE — ED PROVIDER NOTES
MD ATTESTATION NOTE    The CHARITY and I have discussed this patient's history, physical exam, and treatment plan.    I provided a substantive portion of the care of this patient. I personally performed the physical exam, in its entirety. The attached note describes my personal findings.      Enzo Douglas is a 89 y.o. female who presents to the ED c/o fall at rehab facility this evening.  Normal uses a walker for ambulation but did not have 1 in her room.  No lightheadedness or dizziness seen in the event.  Not on any blood thinners.  Patient is at baseline per family.      On exam:  GENERAL: not distressed  HENT: nares patent  EYES: no scleral icterus  CV: regular rhythm, regular rate  RESPIRATORY: normal effort  ABDOMEN: soft  MUSCULOSKELETAL: no deformity  NEURO: alert, moves all extremities, follows commands  SKIN: warm, dry    Labs  No results found for this or any previous visit (from the past 24 hour(s)).    Radiology  CT Head Without Contrast    Result Date: 4/11/2022  Patient: ENZO DOUGLAS  Time Out: 01:49 Exam(s): CT HEAD Without Contrast EXAM:   CT Head Without Intravenous Contrast CLINICAL HISTORY:  fall. TECHNIQUE:   Axial computed tomography images of the head brain without intravenous contrast.  CTDI is 54.8 mGy and DLP is 1056.6 mGy-cm.  This CT exam was performed according to the principle of ALARA (As Low As Reasonably Achievable) by using one or more of the following dose reduction techniques: automated exposure control, adjustment of the mA and or kV according to patient size, and or use of iterative reconstruction technique.  Coronal and sagittal reconstructions are performed. 322 images COMPARISON:   03 26 22 FINDINGS:   Brain:  Moderate to severe age-related generalized brain volume loss and chronic small vessel ischemic changes.  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  No hemorrhage.   Ventricles:  Unremarkable.  No ventriculomegaly.   Bones joints:   Unremarkable.  No acute fracture.   Soft tissues:  Small to moderate left forehead scalp hematoma.   Sinuses:  Unremarkable as visualized.  No acute sinusitis.   Mastoid air cells:  Unremarkable as visualized.  No mastoid effusion.   Orbits:  Bilateral cataract surgeries. IMPRESSION:       No intracranial hemorrhage or skull fracture.     Electronically signed by Antione Jacob M.D. on 04-11-22 at 0149    CT Cervical Spine Without Contrast    Result Date: 4/11/2022  Patient: ENZO MCNALLY  Time Out: 01:55 Exam(s): CT C SPINE EXAM:   CT Cervical Spine Without Intravenous Contrast CLINICAL HISTORY:   fall, neck pain. TECHNIQUE:   Axial computed tomography images of the cervical spine without intravenous contrast.  CTDI is 11.4 mGy and DLP is 199.2 mGy-cm.  This CT exam was performed according to the principle of ALARA (As Low As Reasonably Achievable) by using one or more of the following dose reduction techniques: automated exposure control, adjustment of the mA and or kV according to patient size, and or use of iterative reconstruction technique.   Coronal and sagittal reformatted images were created and reviewed. COMPARISON:   03 26 22 FINDINGS:   Vertebrae:  2-3 mm anterolisthesis of C7 on T1.  No acute findings.   Discs spinal canal neural foramina:  Osteopenia.  Moderate degenerative disc disease.    Soft tissues:  Unremarkable. IMPRESSION:       No acute findings or substantial change     Electronically signed by Antione Jacob M.D. on 04-11-22 at 0155      Medical Decision Making:  ED Course as of 04/11/22 0219   Mon Apr 11, 2022   0208 CT head and cervical spine are not acute. Pt has no acute neurological abnormalities.  I will discharge her home.  Daughter agrees to transport her back to Rehab.  [EW]      ED Course User Index  [EW] Stephanie Henriquez, APRN           PPE: Both the patient and I wore a surgical mask throughout the entire patient encounter. I wore protective goggles.     Diagnosis  Final  diagnoses:   Fall at nursing home, initial encounter   Forehead contusion, initial encounter   Balance problems        Inocente Salgado MD  04/11/22 8169

## 2022-05-09 NOTE — CASE MANAGEMENT/SOCIAL WORK
EMS called to notify they are unable to  patient until 2300 now. Updating primary RN and family. Natali Ortega RN

## 2022-05-09 NOTE — ED TRIAGE NOTES
Pt to ED via [facility] from [facility]. NH reports that the patient was being evaluated at her facility for a UTI. Report from [facility] reports that they did a urine specimen that showed a potential UTI but they were unable to confirm with a culture. Report from [facility] states that they were unable to obtain another specimen from the pt and family requested she be sent to this facility for further evaluation.     This RN in appropriate PPE for all patient care interactions. Pt masked and redirected for proper mask use when necessary. Hand hygiene performed before and after all patient care interactions.

## 2022-05-09 NOTE — DISCHARGE PLACEMENT REQUEST
"Enzo Douglas (89 y.o. Female)             Date of Birth   12/05/1931    Social Security Number       Address   [address] [facility] [place] [id number]    Home Phone   [phone]    MRN   [id number]       Islam   None    Marital Status                               Admission Date   5/9/22    Admission Type   Emergency    Admitting Provider       Attending Provider   Ja Adorno MD    Department, Room/Bed   [facility] Emergency Department, 09/09       Discharge Date       Discharge Disposition       Discharge Destination                               Attending Provider: Ja Adorno MD    Allergies: Bacitracin    Isolation: None   Infection: None   Code Status: Prior   Advance Care Planning Activity    Ht: 157.5 cm (62\")   Wt: 56.3 kg (124 lb 3.2 oz)    Admission Cmt: None   Principal Problem: None                Active Insurance as of 5/9/2022     Primary Coverage     Payor Plan Insurance Group Employer/Plan Group    MEDICARE MEDICARE A & B      Payor Plan Address Payor Plan Phone Number Payor Plan Fax Number Effective Dates    [address] [phone]  12/1/1996 - None Entered    [facility] [id number]       Subscriber Name Subscriber Birth Date Member ID       ENZO DOUGLAS 12/5/1931 [id number]           Secondary Coverage     Payor Plan Insurance Group Employer/Plan Group    [place] [id number]     Payor Plan Address Payor Plan Phone Number Payor Plan Fax Number Effective Dates    [address]   10/15/1998 - None Entered    [facility] [id number]       Subscriber Name Subscriber Birth Date Member ID       ENZO DOUGLAS 12/5/1931 [id number]                 Emergency Contacts      (Rel.) Home Phone Work Phone Mobile Phone    ashoksherrie (POA) (Daughter) [phone] -- [phone]    jayce almonte (Son) [phone] -- [phone]    hunter almonte (Son) [phone] -- [phone]    campos almonte (daughter in " law) (Relative) [phone] -- [phone]

## 2022-05-09 NOTE — ED PROVIDER NOTES
EMERGENCY DEPARTMENT ENCOUNTER    Room Number:  09/09  Date of encounter:  5/9/2022  PCP: Divina Nassar MD  Historian: Patient and nursing triage note    Patient was placed in face mask during triage process. Patient was wearing facemask when I entered the room and throughout our encounter. I wore full protective equipment throughout this patient encounter including a face mask, eye protection, and gloves. Hand hygiene was performed before donning protective equipment and again following doffing of PPE after leaving the room.    HPI:  Chief Complaint: Back pain/abnormal urinalysis  A complete HPI/ROS/PMH/PSH/SH/FH are unobtainable due to: N/A   Context: Katie Douglas is a 89 y.o. female who presents to the ED via EMS after the patient complained repeatedly of back pain during the night.  Family was notified and EMS was called.  Patient reports her back pain is not that severe at this time.  Reportedly the patient had an abnormal urinalysis but culture was unable to be obtained.  Patient reports not recently being on any antibiotics.  Family confirms this.  Patient denies significant pain at this time with back pain that moves from the right to the left and sometimes into her abdomen.  She has had diminished appetite since entering the rehab facility more than a month ago with diminished weight.  No vomiting or diarrhea reported.  No dysuria specifically is reported.  No known fevers, cough, shortness of breath or chest discomfort is reported.    Patient does note that her back pain is worse when her caretakers move her at the nursing facility where she resides.  She is not aware of any specific blunt trauma to her spine.  She does note that she has had back pain intermittently for many years.  Minimal discomfort at this time.      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      FAMILY HISTORY  No family history on file.      SOCIAL HISTORY  Social History     Socioeconomic History   • Marital status:    Tobacco Use   • Smoking status: Never Smoker   • Smokeless tobacco: Never Used   Vaping Use   • Vaping Use: Never used         ALLERGIES  Bacitracin        REVIEW OF SYSTEMS  Review of Systems     All systems reviewed and negative except for those discussed in HPI.       PHYSICAL EXAM    I have reviewed the triage vital signs and nursing notes.    ED Triage Vitals [05/09/22 0148]   Temp Heart Rate Resp BP SpO2   98.1 °F (36.7 °C) 94 16 140/67 94 %      Temp src Heart Rate Source Patient Position BP Location FiO2 (%)   Oral -- -- -- --       Physical Exam    Physical Exam   Constitutional: No distress.  Thin frail and elderly appearing but not overtly toxic.  HENT:  Head: Normocephalic and atraumatic.   Oropharynx: Mucous membranes are moist.   Eyes: No scleral icterus. No conjunctival pallor.  Neck: Painless range of motion noted. Neck supple.   Cardiovascular: Normal rate, regular rhythm and intact distal pulses.  Pulmonary/Chest: No respiratory distress. There are no wheezes, no rhonchi, and no rales.   Abdominal: Soft. There is mild suprapubic discomfort with palpation without other focal tenderness.  The patient rates the soft feeling like she needs to go to the bathroom quite severely at  this point.  There is no rebound and no guarding.   Musculoskeletal: Moves all extremities equally. There is no pedal edema or calf tenderness.   Neurological: Alert and oriented to self, family, date and location.  Baseline strength and sensation noted.   Skin: Skin is pink, warm, and dry. No pallor.   Psychiatric: Mood and affect normal.   Nursing note and vitals reviewed.    LAB RESULTS  Recent Results (from the past 24 hour(s))   Urinalysis With Culture If Indicated - Urine, Catheter In/Out    Collection Time: 05/09/22  8:47 AM    Specimen: Urine, Catheter In/Out   Result Value Ref Range    Color, UA Dark Yellow (A) Yellow, Straw    Appearance, UA Cloudy (A) Clear    pH, UA 5.5 5.0 - 8.0    Specific Gravity, UA 1.018 1.005 - 1.030    Glucose, UA Negative Negative    Ketones, UA Trace (A) Negative    Bilirubin, UA Negative Negative    Blood, UA Small (1+) (A) Negative    Protein, UA Trace (A) Negative    Leuk Esterase, UA Moderate (2+) (A) Negative    Nitrite, UA Negative Negative    Urobilinogen, UA 0.2 E.U./dL 0.2 - 1.0 E.U./dL   Urinalysis, Microscopic Only - Urine, Catheter In/Out    Collection Time: 05/09/22  8:47 AM    Specimen: Urine, Catheter In/Out   Result Value Ref Range    RBC, UA 3-5 (A) None Seen, 0-2 /HPF    WBC, UA Too Numerous to Count (A) None Seen, 0-2 /HPF    Bacteria, UA 3+ (A) None Seen /HPF    Squamous Epithelial Cells, UA 7-12 (A) None Seen, 0-2 /HPF    Hyaline Casts, UA 0-2 None Seen /LPF    WBC Clumps, UA Small/1+ None Seen /HPF    Methodology Manual Light Microscopy    Comprehensive Metabolic Panel    Collection Time: 05/09/22  8:51 AM    Specimen: Blood   Result Value Ref Range    Glucose 95 65 - 99 mg/dL    BUN 14 8 - 23 mg/dL    Creatinine 0.82 0.57 - 1.00 mg/dL    Sodium 138 136 - 145 mmol/L    Potassium 3.4 (L) 3.5 - 5.2 mmol/L    Chloride 101 98 - 107 mmol/L    CO2 26.0 22.0 - 29.0 mmol/L    Calcium 9.5 8.2 - 9.6 mg/dL    Total Protein 7.5 6.0 - 8.5 g/dL    Albumin 3.80 3.50 -  5.20 g/dL    ALT (SGPT) 10 1 - 33 U/L    AST (SGOT) 11 1 - 32 U/L    Alkaline Phosphatase 87 39 - 117 U/L    Total Bilirubin 0.8 0.0 - 1.2 mg/dL    Globulin 3.7 gm/dL    A/G Ratio 1.0 g/dL    BUN/Creatinine Ratio 17.1 7.0 - 25.0    Anion Gap 11.0 5.0 - 15.0 mmol/L    eGFR 68.0 >60.0 mL/min/1.73   CBC Auto Differential    Collection Time: 05/09/22  8:51 AM    Specimen: Blood   Result Value Ref Range    WBC 10.99 (H) 3.40 - 10.80 10*3/mm3    RBC 4.42 3.77 - 5.28 10*6/mm3    Hemoglobin 12.4 12.0 - 15.9 g/dL    Hematocrit 38.0 34.0 - 46.6 %    MCV 86.0 79.0 - 97.0 fL    MCH 28.1 26.6 - 33.0 pg    MCHC 32.6 31.5 - 35.7 g/dL    RDW 12.8 12.3 - 15.4 %    RDW-SD 39.6 37.0 - 54.0 fl    MPV 10.4 6.0 - 12.0 fL    Platelets 206 140 - 450 10*3/mm3    Neutrophil % 69.7 42.7 - 76.0 %    Lymphocyte % 17.6 (L) 19.6 - 45.3 %    Monocyte % 10.4 5.0 - 12.0 %    Eosinophil % 1.4 0.3 - 6.2 %    Basophil % 0.4 0.0 - 1.5 %    Immature Grans % 0.5 0.0 - 0.5 %    Neutrophils, Absolute 7.67 (H) 1.70 - 7.00 10*3/mm3    Lymphocytes, Absolute 1.93 0.70 - 3.10 10*3/mm3    Monocytes, Absolute 1.14 (H) 0.10 - 0.90 10*3/mm3    Eosinophils, Absolute 0.15 0.00 - 0.40 10*3/mm3    Basophils, Absolute 0.04 0.00 - 0.20 10*3/mm3    Immature Grans, Absolute 0.06 (H) 0.00 - 0.05 10*3/mm3    nRBC 0.1 0.0 - 0.2 /100 WBC       Ordered the above labs and independently reviewed the results.        RADIOLOGY  CT Abdomen Pelvis Stone Protocol    Result Date: 5/9/2022  CT ABDOMEN AND PELVIS WITH IV CONTRAST  HISTORY: 89-year-old with right flank pain, back pain, dysuria.  TECHNIQUE: Radiation dose reduction techniques were utilized, including automated exposure control and exposure modulation based on body size. 3 mm images were obtained through the abdomen and pelvis after the administration of IV contrast. , CT spine 03/26/2022 COMPARISON: CT chest 01/10/2021  FINDINGS: Lower chest: Calcified granulomas. The heart size is within normal limits. Lungs are  hyperinflated with coarsened interstitial markings. Calcified and noncalcified subcentimeter pulmonary nodules with an index 6 mm nodule in the left lower lobe (series 2 image 15), unchanged from 01/10/2021. Please refer to that study for further detail and follow-up recommendations.  Liver: Within normal limits.  Biliary tract: Cholelithiasis..  Spleen: Calcified granulomas.  Pancreas: Lobular contour with a too small to characterize hypodensity in the tail the pancreas measuring 4 mm which can be followed on subsequent surveillance.  Adrenals: Nodular thickening left adrenal gland similar to the prior. Right gland within normal limits.  Kidneys:  Renal parenchymal scarring with intrarenal vascular calcifications. No hydronephrosis. There is lobular contour along the anterior aspect of the left renal vasculature (series 2 image 44) unchanged from prior imaging and may be secondary to tortuosity of the vasculature but cannot be completely evaluated on this limited noncontrast exam.  Bowel:  Small hiatal hernia. The stomach is incompletely distended. No obstruction. Extensive diverticulosis particularly involving the descending/sigmoid colon. Please ensure up-to-date with colonoscopy.  Peritoneum: No free fluid or free intraperitoneal air.  Vasculature:    Moderate calcific atherosclerosis. Ectasia of the abdominal aorta without focal aneurysmal dilatation.  Lymph Nodes:  Scattered subcentimeter abdominoperitoneal nodes.                                                       Pelvic organs: Diffuse thickening of the urinary bladder with perivesical stranding and a tiny focus of air in the nondependent portion. Uterus is in situ. Presacral soft tissue stranding.  Abdominal/Pelvic Wall: Within normal limits.  Bones: Diffuse bone demineralization with multilevel degenerative changes. Heterogeneity of the osseous structures consistent with demineralization. Scattered sclerotic foci in the axial and appendicular skeleton  similar to the prior favoring bone islands. Chronic appearing compression fracture of T12 with advanced degenerative changes. Please refer to the CT of the spine for further detail..      1.  Diffuse bladder wall thickening with perivesical stranding extending into the presacral space compatible with cystitis. Recommend attention on short-term follow-up to resolution after treatment. Tiny focus of air in the urinary bladder likely the sequela of recent intervention. 2.  Colonic diverticulosis with chronic thickening of the sigmoid colon, ensure up-to-date with colonoscopy. 3.  Calcified and noncalcified pulmonary nodules measuring up to 6 mm in the left lower lobe, unchanged from 01/10/2021, please refer to the CT for further detail and follow-up recommendations. 4.  Increased attenuation along the left renal vasculature and a too small to characterize hypodensity in the tail of the pancreas similar in appearance to the prior but cannot be completely characterized without contrast. 5.   Chronic appearing compression fracture T12. Please refer to the CT of the spine for further detail. 6.  Cholelithiasis. 7.  Please see above for additional findings.    This report was finalized on 5/9/2022 9:39 AM by Dr. Jimmie Silva M.D.        I ordered the above noted radiological studies. Reviewed by me and discussed with radiologist.  See dictation for official radiology interpretation.      PROCEDURES    Procedures        MEDICATIONS GIVEN IN ER    Medications   sodium chloride 0.9 % flush 10 mL (has no administration in time range)   sodium chloride 0.9 % bolus 500 mL (0 mL Intravenous Stopped 5/9/22 1021)     Followed by   sodium chloride 0.9 % infusion (125 mL/hr Intravenous New Bag 5/9/22 1021)   cefTRIAXone (ROCEPHIN) 1 g in sodium chloride 0.9 % 100 mL IVPB-VTB (has no administration in time range)   acetaminophen (TYLENOL) tablet 1,000 mg (1,000 mg Oral Given 5/9/22 0907)         PROGRESS, DATA ANALYSIS, CONSULTS, AND  MEDICAL DECISION MAKING    My differential diagnosis for back pain includes but is not limited to:  Musculoskeletal strain, contusion, retroperitoneal hematoma, disc protrusion, vertebral fracture, transverse process fracture, rib fracture, facet syndrome, sacroiliac joint strain, sciatica, renal injury, splenic injury, pancreatic injury, osteoarthritis, lumbar spondylosis, spinal stenosis, ankylosing spondylitis, sacroiliac joint inflammation, pancreatitis, perforated peptic ulcer, diverticulitis, endometriosis, chronic PID, epidural abscess, osteomyelitis, retroperitoneal abscess, pyelonephritis, pneumonia, subphrenic abscess, tuberculosis, neurofibroma, meningioma, multiple myeloma, lymphoma, metastatic cancer, primary cancer, AAA, aortic dissection, spinal ischemia, referred pain, ureterolithiasis      All labs have been independently reviewed by me.  All radiology studies have been reviewed by me and discussed with radiologist dictating the report.   EKG's independently viewed and interpreted by me.  Discussion below represents my analysis of pertinent findings related to patient's condition, differential diagnosis, treatment plan and final disposition.      ED Course as of 05/09/22 1034   Mon May 09, 2022   0935 WBC(!): 10.99 [RS]   0935 Hemoglobin: 12.4 [RS]   0935 Platelets: 206 [RS]   0935 Nitrite, UA: Negative [RS]   0935 Leukocytes, UA(!): Moderate (2+) [RS]   1025 WBC, UA(!): Too Numerous to Count [RS]   1025 Bacteria, UA(!): 3+ [RS]   1026 Based on review of most recent urine culture 3/26/2022, as noted below, ceftriaxone for UTI has been selected.    Urine Culture   >100,000 CFU/mL Klebsiella pneumoniae ssp pneumoniae Abnormal          Resulting Agency: [facility]      Susceptibility       Klebsiella pneumoniae ssp pneumoniae    FRIDA    Ampicillin 16 ug/ml Resistant    Ampicillin + Sulbactam 4 ug/ml Susceptible    Cefazolin <=4 ug/ml Susceptible    Cefepime <=1 ug/ml Susceptible    Ceftazidime <=1  ug/ml Susceptible    Ceftriaxone <=1 ug/ml Susceptible    Gentamicin <=1 ug/ml Susceptible    Levofloxacin <=0.12 ug/ml Susceptible    Nitrofurantoin 64 ug/ml Intermediate    Piperacillin + Tazobactam <=4 ug/ml Susceptible    Trimethoprim + Sulfamethoxazole <=20 ug/ml Susceptible                   [RS]   1034 Reviewed all findings with patient and family.  IV ceftriaxone pending infusion.  Planning disposition.  Family would like to speak with case management about disposition planning. [RS]      ED Course User Index  [RS] Ja Adorno MD       AS OF 10:34 EDT VITALS:    BP - 158/75  HR - 59  TEMP - 98.1 °F (36.7 °C) (Oral)  O2 SATS - 94%        DIAGNOSIS  Final diagnoses:   Acute UTI   Acute on chronic low back pain   Poor appetite   Generalized weakness   Nursing home resident         DISPOSITION  DISCHARGE    Patient discharged in stable condition.    Reviewed implications of results, diagnosis, meds, responsibility to follow up, warning signs and symptoms of possible worsening, potential complications and reasons to return to ER.    Patient/Family voiced understanding of above instructions.    Discussed plan for discharge, as there is no emergent indication for admission. Patient referred to primary care provider for regular health maintenance. Pt/family is agreeable and understands need for follow up and possible repeat testing.  Pt is aware that discharge does not mean that nothing is wrong but it indicates no emergency is present that requires admission and they must continue care with follow-up as given below or physician of their choice.     FOLLOW-UP  Divina Nassar MD  [address] [id number] [phone]    Schedule an appointment as soon as possible for a visit in 3 days           Medication List      New Prescriptions    cefuroxime 500 MG tablet  Commonly known as: CEFTIN  Take 1 tablet by mouth 2 (Two) Times a Day.           Where to Get Your Medications      You can get these  medications from any pharmacy    Bring a paper prescription for each of these medications  · cefuroxime 500 MG tablet            Ja Adorno MD  05/09/22 0904

## 2022-05-09 NOTE — CASE MANAGEMENT/SOCIAL WORK
Discharge Planning Assessment  [facility]     Patient Name: Katie Douglas  MRN: [id number]  Today's Date: 5/9/2022    Admit Date: 5/9/2022     Discharge Needs Assessment    No documentation.                Discharge Plan     Row Name 05/09/22 1424       Plan    Plan Comments Late entry- Entered room, introduced self and explained role w/PPE in place on self, son and daughter; patient is sleeping with mask removed- patient awakens with verbal command; patient has been staying in [facility] over the past month since discharge from the hospital; Family have verbalized concerns regarding care provided since patient arrived at [facility]; Prior to rehab patient lived in a house w/grandson; was mostly independent w/ADL's- has a cane, walker and w/c; walk in shower w/shower chair and grab bars; Son, daughter and patient verbalize wishes to be d/c home or possible transfer to another facility. Provided RTR, sitter list and discussed options as well as DME likely needed in the home environment; Updated ER provider who discussed concerns with family to be d/c home from the ED vs returning to rehab and home from there; Ultimately, patient and family agreed that returning to [facility] to allow for better planning for d/c home is a better plan; daughter has currently left for a conference with staff at [facility], son remains at bedside; Call placed to [facility] to schedule transport back to [facility]. spoke w/Sissy who gave an ETA of 1900;    Final Discharge Disposition Code 03 - skilled nursing facility (SNF);62 - inpatient rehab facility              Continued Care and Services - Admitted Since 5/9/2022     Destination     Service Provider Request Status Selected Services Address Phone Fax Patient Preferred    [facility]  Declined  No Bed Available N/A [address] [facility] [address] [phone] --            Selected Continued Care - Prior Encounters Includes selections from prior  encounters from 2/8/2022 to 5/9/2022    Discharged on 4/6/2022 Admission date: 3/26/2022 - Discharge disposition: Skilled Nursing Facility (DC - External)    Destination     Service Provider Selected Services Address Phone Fax Patient Preferred    [facility]  Skilled Nursing [address] [facility] [address] [phone] --       Internal Comment last updated by Lidia Pacheco RN 3/29/2022 0858    2ND                                    Demographic Summary    No documentation.                Functional Status    No documentation.                Psychosocial    No documentation.                Abuse/Neglect    No documentation.                Legal    No documentation.                Substance Abuse    No documentation.                Patient Forms    No documentation.                   Natali Ortega RN

## 2022-05-17 PROBLEM — M54.59 INTRACTABLE LOW BACK PAIN: Status: ACTIVE | Noted: 2022-01-01

## 2022-05-18 PROBLEM — E87.6 HYPOKALEMIA: Status: ACTIVE | Noted: 2022-01-01

## 2022-05-18 PROBLEM — F03.90 DEMENTIA WITHOUT BEHAVIORAL DISTURBANCE (HCC): Status: ACTIVE | Noted: 2022-01-01

## 2022-05-18 PROBLEM — E83.42 HYPOMAGNESEMIA: Status: ACTIVE | Noted: 2022-01-01

## 2022-05-20 PROBLEM — I67.9 CEREBROVASCULAR SMALL VESSEL DISEASE: Status: ACTIVE | Noted: 2022-01-01

## 2022-05-20 PROBLEM — G31.9 CEREBRAL ATROPHY (HCC): Status: ACTIVE | Noted: 2022-01-01

## 2022-05-20 PROBLEM — F02.80 DEMENTIA DUE TO MEDICAL CONDITION WITHOUT BEHAVIORAL DISTURBANCE (HCC): Status: ACTIVE | Noted: 2022-01-01

## 2022-05-20 PROBLEM — S22.080D COMPRESSION FRACTURE OF T12 VERTEBRA WITH ROUTINE HEALING: Status: ACTIVE | Noted: 2022-01-01

## 2022-05-20 PROBLEM — Z86.73 HISTORY OF CVA (CEREBROVASCULAR ACCIDENT): Status: ACTIVE | Noted: 2022-01-01

## 2022-05-20 PROBLEM — M41.86 DEXTROSCOLIOSIS OF LUMBAR SPINE: Status: ACTIVE | Noted: 2022-01-01

## 2022-05-20 PROBLEM — G31.1 SENILE DEGENERATION OF BRAIN (HCC): Status: ACTIVE | Noted: 2022-01-01

## 2022-05-20 PROBLEM — Z51.5 PALLIATIVE CARE BY SPECIALIST: Status: ACTIVE | Noted: 2022-01-01

## 2022-05-21 PROBLEM — R62.7 FAILURE TO THRIVE IN ADULT: Status: ACTIVE | Noted: 2022-01-01

## 2022-06-01 NOTE — PROGRESS NOTES
Case Management Discharge Note      Final Note: The patient  on 22 @ 14:20. B. Rosi Michelle, CCP         Selected Continued Care - Discharged on 2022 Admission date: 2022 - Discharge disposition:     Destination Coordination complete.    Service Provider Selected Services Address Phone Fax Patient Preferred    [facility]  [facility] [address], [facility] [address] [phone] --          Durable Medical Equipment    No services have been selected for the patient.              Dialysis/Infusion    No services have been selected for the patient.              Home Medical Care    No services have been selected for the patient.              Therapy    No services have been selected for the patient.              Community Resources    No services have been selected for the patient.              Community & DME    No services have been selected for the patient.                Selected Continued Care - Prior Encounters Includes selections from prior encounters from 2022 to 2022    Discharged on 2022 Admission date: 2022 - Discharge disposition: Hospice/Medical Facility ([facility])    Destination     Service Provider Selected Services Address Phone Fax Patient Preferred    [facility] [address], [facility] [address] [phone] --                Discharged on 2022 Admission date: 3/26/2022 - Discharge disposition: Skilled Nursing Facility (DC - External)    Destination     Service Provider Selected Services Address Phone Fax Patient Preferred    [facility]  Skilled Nursing [address] [address] [phone] --       Internal Comment last updated by Lidia Pacheco RN 3/29/2022 0858    2ND                                      Final Discharge Disposition Code: 41 -  in medical facility